# Patient Record
Sex: FEMALE | Race: WHITE | NOT HISPANIC OR LATINO | Employment: FULL TIME | URBAN - METROPOLITAN AREA
[De-identification: names, ages, dates, MRNs, and addresses within clinical notes are randomized per-mention and may not be internally consistent; named-entity substitution may affect disease eponyms.]

---

## 2019-06-04 PROBLEM — M18.0 PRIMARY OSTEOARTHRITIS OF BOTH FIRST CARPOMETACARPAL JOINTS: Status: ACTIVE | Noted: 2019-06-04

## 2019-07-02 PROBLEM — M18.0 ARTHRITIS OF CARPOMETACARPAL (CMC) JOINT OF BOTH THUMBS: Status: ACTIVE | Noted: 2019-07-02

## 2019-07-02 PROBLEM — R22.31: Status: ACTIVE | Noted: 2019-07-02

## 2019-07-18 PROBLEM — E53.8 VITAMIN B12 DEFICIENCY: Status: ACTIVE | Noted: 2019-07-18

## 2020-08-05 ENCOUNTER — TELEPHONE (OUTPATIENT)
Dept: ORTHOPEDICS | Facility: CLINIC | Age: 55
End: 2020-08-05

## 2020-08-05 NOTE — TELEPHONE ENCOUNTER
Unable to reach pt to schedule an appointment with Dr. Blanca. Left message with information to follow up.

## 2020-08-31 ENCOUNTER — OFFICE VISIT (OUTPATIENT)
Dept: ORTHOPEDICS | Facility: CLINIC | Age: 55
End: 2020-08-31
Payer: COMMERCIAL

## 2020-08-31 VITALS
HEIGHT: 67 IN | DIASTOLIC BLOOD PRESSURE: 68 MMHG | BODY MASS INDEX: 26.09 KG/M2 | SYSTOLIC BLOOD PRESSURE: 117 MMHG | HEART RATE: 71 BPM | WEIGHT: 166.25 LBS

## 2020-08-31 DIAGNOSIS — M18.0 PRIMARY ARTHROSIS OF FIRST CARPOMETACARPAL JOINTS, BILATERAL: Primary | ICD-10-CM

## 2020-08-31 PROCEDURE — 99999 PR PBB SHADOW E&M-EST. PATIENT-LVL III: ICD-10-PCS | Mod: PBBFAC,,, | Performed by: ORTHOPAEDIC SURGERY

## 2020-08-31 PROCEDURE — 99203 OFFICE O/P NEW LOW 30 MIN: CPT | Mod: S$GLB,,, | Performed by: ORTHOPAEDIC SURGERY

## 2020-08-31 PROCEDURE — 99203 PR OFFICE/OUTPT VISIT, NEW, LEVL III, 30-44 MIN: ICD-10-PCS | Mod: S$GLB,,, | Performed by: ORTHOPAEDIC SURGERY

## 2020-08-31 PROCEDURE — 99999 PR PBB SHADOW E&M-EST. PATIENT-LVL III: CPT | Mod: PBBFAC,,, | Performed by: ORTHOPAEDIC SURGERY

## 2020-08-31 RX ORDER — MELOXICAM 15 MG/1
15 TABLET ORAL DAILY
Qty: 30 TABLET | Refills: 0 | Status: SHIPPED | OUTPATIENT
Start: 2020-08-31 | End: 2021-03-12

## 2020-08-31 NOTE — LETTER
September 2, 2020      [place]  [address] [address] [facility]  [facility] [address]           [facility]  [address] [place] [address]  Phone: [phone]          Patient: Diane Vazquez   MR Number: [id number]   YOB: 1965   Date of Visit: 8/31/2020       Dear Dr. Samson Talamantes II:    Thank you for referring Diane Vazquez to me for evaluation. Attached you will find relevant portions of my assessment and plan of care.    If you have questions, please do not hesitate to call me. I look forward to following Diane Vazquez along with you.    Sincerely,    Miah Blanca MD    Enclosure  CC:  No Recipients    If you would like to receive this communication electronically, please contact [email or web address] or [phone] to request more information on [facility] access.    For providers and/or their staff who would like to refer a patient to [facility], please contact us through our one-stop-shop provider referral line, [facility], at [phone].    If you feel you have received this communication in error or would no longer like to receive these types of communications, please e-mail [email or web address]

## 2020-08-31 NOTE — PROGRESS NOTES
"8/31/2020    Chief Complaint:  Chief Complaint   Patient presents with    Bilateral thumb/hand pain onset 8 months ago     pt states "I've had 3 rounds of injections for this."       HPI:  Diane Vazquez is a 55 y.o. female, who presents to clinic today she has a history of bilateral thumb pain.  She states that this started approximately 3 months ago.  She has been seen by Dr. Alonzo.  He has performed 3 separate injections into the CMC joints.  She states that the initial injection did give her relief but the most recent injections have lasted only 1 or 2 weeks.  She is here today for further evaluation and treatment options.  She has no other complaints period    PMHX:  Past Medical History:   Diagnosis Date    Anxiety state, unspecified     Madsen's esophagus     Chronic lymphocytic thyroiditis     Early stage skin cancer     Other and unspecified hyperlipidemia     Pure hypercholesterolemia     Tobacco use disorder        PSHX:  Past Surgical History:   Procedure Laterality Date    CHOLECYSTECTOMY         FMHX:  Family History   Problem Relation Age of Onset    Heart disease Father     Hyperlipidemia Father     Hypertension Father     Cancer Father         colon, lungs    Melanoma Mother     Mental illness Brother     Other Daughter         bowel disease    Depression Daughter     Depression Maternal Grandmother     Cancer Paternal Grandmother        SOCHX:  Social History     Tobacco Use    Smoking status: Current Every Day Smoker     Packs/day: 0.50     Years: 34.00     Pack years: 17.00     Types: Cigarettes    Smokeless tobacco: Never Used   Substance Use Topics    Alcohol use: Yes     Frequency: 2-4 times a month     Drinks per session: 1 or 2     Binge frequency: Never     Comment: Occasionally: Wine       ALLERGIES:  Patient has no known allergies.    CURRENT MEDICATIONS:  Current Outpatient Medications on File Prior to Visit   Medication Sig Dispense Refill    buPROPion " (WELLBUTRIN) 75 MG tablet Take 1 tablet (75 mg total) by mouth 2 (two) times daily. 90 tablet 2    cyanocobalamin 1,000 mcg/mL injection Inject 1 mL (1,000 mcg total) into the skin every 28 days. 4 mL 2    ergocalciferol, vitamin D2, (VITAMIN D ORAL) Vitamin D      ezetimibe (ZETIA) 10 mg tablet Take 1 tablet (10 mg total) by mouth once daily. 90 tablet 3    levothyroxine (SYNTHROID) 88 MCG tablet Take 1 tablet (88 mcg total) by mouth before breakfast. 90 tablet 3    mirabegron (MYRBETRIQ) 25 mg Tb24 ER tablet Take 1 tablet (25 mg total) by mouth once daily. 30 tablet 11    omeprazole (PRILOSEC) 20 MG capsule Take 1 capsule (20 mg total) by mouth once daily. 90 capsule 2    albuterol (PROVENTIL/VENTOLIN HFA) 90 mcg/actuation inhaler Inhale 2 puffs into the lungs every 4 (four) hours as needed for Wheezing. Rescue 18 g 2    budesonide-formoterol 160-4.5 mcg (SYMBICORT) 160-4.5 mcg/actuation HFAA Inhale 2 puffs into the lungs 2 (two) times daily. Controller for 5 days (Patient not taking: Reported on 3/3/2020) 6 g 0    valACYclovir (VALTREX) 500 MG tablet Take 1 tablet (500 mg total) by mouth 2 (two) times daily. for 5 days 10 tablet 3     No current facility-administered medications on file prior to visit.        REVIEW OF SYSTEMS:  Review of Systems   Constitutional: Negative for diaphoresis and fever.   HENT: Positive for tinnitus. Negative for ear pain, hearing loss and nosebleeds.    Eyes: Negative for pain and redness.   Respiratory: Negative for cough and shortness of breath.    Cardiovascular: Negative for chest pain and palpitations.   Gastrointestinal: Negative for blood in stool, constipation, diarrhea, nausea and vomiting.   Genitourinary: Positive for frequency. Negative for dysuria and hematuria.   Musculoskeletal: Negative for back pain and myalgias.   Skin: Negative for itching and rash.   Neurological: Negative for dizziness, tingling, seizures, weakness and headaches.   Endo/Heme/Allergies:  "Negative for environmental allergies.   Psychiatric/Behavioral: The patient is not nervous/anxious.        GENERAL PHYSICAL EXAM:   Ht 5' 6.5" (1.689 m)   Wt 75.4 kg (166 lb 3.6 oz)   BMI 26.43 kg/m²    GEN: well developed, well nourished, no acute distress   HENT: Normocephalic, atraumatic   EYES: No discharge, conjunctiva normal   NECK: Supple, non-tender   PULM: No wheezing, no respiratory distress   CV: RRR   ABD: Soft, non-tender    ORTHO EXAM:   Examination bilateral hands reveals that there is no edema.  There are no significant deformities.  Palpation produces tenderness over the CMC joints bilaterally.  She does have positive grind test on the right and on the left.  Symptoms on left wrist are significantly less than the right.  She is able to flex and extend both thumbs.  She does have sensation intact in the median radial ulnar distributions bilaterally.  She has 2+ radial pulses bilaterally.    RADIOLOGY:   X-rays of both the right and left hands from approximately 1 year ago have been reviewed.  She is noted to have moderate degenerative changes about the right thumb CMC joint and mild on the left.  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  I would like to have the patient attempt oral anti-inflammatory therapy and splinting and therefore will provide a prescription for Mobic and I will write her the information to obtain a Gloria  splint    2.  She will follow up with me in 6 weeks for an assessment of the effectiveness of those treatments at which point I will discuss further treatment options which could include surgical intervention is the patient has failed steroid injections      Answers for HPI/ROS submitted by the patient on 8/25/2020   Hand injury  unexpected weight change: Yes  appetite change : No  sleep disturbance: Yes  IMMUNOCOMPROMISED: No  dysphoric mood: No  visual disturbance: No  sinus pressure " : No  food allergies: Yes  difficulty urinating: No  painful intercourse: No  numbness: No  joint swelling: No  Pain Chronicity: chronic  History of trauma: No  Onset: more than 1 year ago  Frequency: constantly  Progression since onset: gradually worsening  injury location: at home  pain- numeric: 8/10  pain location: left hand, right hand  pain quality: hot, sharp, burning  Radiating Pain: Yes  If your pain is radiating, to what part of the body?: left arm, right arm  Aggravating factors: activity  inability to bear weight: No  joint locking: No  limited range of motion: Yes  stiffness: Yes  Treatments tried: cold, heat, injection treatment, NSAIDs, rest  physical therapy: not tried  Improvement on treatment: no relief

## 2020-10-12 ENCOUNTER — OFFICE VISIT (OUTPATIENT)
Dept: ORTHOPEDICS | Facility: CLINIC | Age: 55
End: 2020-10-12
Payer: COMMERCIAL

## 2020-10-12 ENCOUNTER — OFFICE VISIT (OUTPATIENT)
Dept: GASTROENTEROLOGY | Facility: CLINIC | Age: 55
End: 2020-10-12
Payer: COMMERCIAL

## 2020-10-12 VITALS
BODY MASS INDEX: 26.09 KG/M2 | WEIGHT: 166.25 LBS | DIASTOLIC BLOOD PRESSURE: 60 MMHG | HEIGHT: 67 IN | HEART RATE: 66 BPM | SYSTOLIC BLOOD PRESSURE: 130 MMHG

## 2020-10-12 DIAGNOSIS — K22.70 BARRETT'S ESOPHAGUS WITHOUT DYSPLASIA: Primary | ICD-10-CM

## 2020-10-12 DIAGNOSIS — Z80.0 FAMILY HISTORY OF COLON CANCER: ICD-10-CM

## 2020-10-12 DIAGNOSIS — K21.9 GASTROESOPHAGEAL REFLUX DISEASE, UNSPECIFIED WHETHER ESOPHAGITIS PRESENT: ICD-10-CM

## 2020-10-12 DIAGNOSIS — Z01.812 PRE-PROCEDURE LAB EXAM: ICD-10-CM

## 2020-10-12 DIAGNOSIS — M18.0 ARTHRITIS OF CARPOMETACARPAL (CMC) JOINT OF BOTH THUMBS: Primary | ICD-10-CM

## 2020-10-12 PROCEDURE — 20600 DRAIN/INJ JOINT/BURSA W/O US: CPT | Mod: 50,S$GLB,, | Performed by: ORTHOPAEDIC SURGERY

## 2020-10-12 PROCEDURE — 99213 OFFICE O/P EST LOW 20 MIN: CPT | Mod: 25,S$GLB,, | Performed by: ORTHOPAEDIC SURGERY

## 2020-10-12 PROCEDURE — 99999 PR PBB SHADOW E&M-EST. PATIENT-LVL III: ICD-10-PCS | Mod: PBBFAC,,, | Performed by: INTERNAL MEDICINE

## 2020-10-12 PROCEDURE — 99203 OFFICE O/P NEW LOW 30 MIN: CPT | Mod: S$GLB,,, | Performed by: INTERNAL MEDICINE

## 2020-10-12 PROCEDURE — 99203 PR OFFICE/OUTPT VISIT, NEW, LEVL III, 30-44 MIN: ICD-10-PCS | Mod: S$GLB,,, | Performed by: INTERNAL MEDICINE

## 2020-10-12 PROCEDURE — 99999 PR PBB SHADOW E&M-EST. PATIENT-LVL III: CPT | Mod: PBBFAC,,, | Performed by: ORTHOPAEDIC SURGERY

## 2020-10-12 PROCEDURE — 20600 SMALL JOINT ASPIRATION/INJECTION: ICD-10-PCS | Mod: 50,S$GLB,, | Performed by: ORTHOPAEDIC SURGERY

## 2020-10-12 PROCEDURE — 99213 PR OFFICE/OUTPT VISIT, EST, LEVL III, 20-29 MIN: ICD-10-PCS | Mod: 25,S$GLB,, | Performed by: ORTHOPAEDIC SURGERY

## 2020-10-12 PROCEDURE — 99999 PR PBB SHADOW E&M-EST. PATIENT-LVL III: CPT | Mod: PBBFAC,,, | Performed by: INTERNAL MEDICINE

## 2020-10-12 PROCEDURE — 99999 PR PBB SHADOW E&M-EST. PATIENT-LVL III: ICD-10-PCS | Mod: PBBFAC,,, | Performed by: ORTHOPAEDIC SURGERY

## 2020-10-12 RX ORDER — MELATONIN 5 MG
CAPSULE ORAL
Status: ON HOLD | COMMUNITY
End: 2022-08-25 | Stop reason: HOSPADM

## 2020-10-12 RX ORDER — HYDROCODONE BITARTRATE AND ACETAMINOPHEN 7.5; 325 MG/1; MG/1
1 TABLET ORAL
COMMUNITY
Start: 2020-10-02 | End: 2021-03-12

## 2020-10-12 RX ADMIN — TRIAMCINOLONE ACETONIDE 40 MG: 40 INJECTION, SUSPENSION INTRA-ARTICULAR; INTRAMUSCULAR at 08:10

## 2020-10-12 NOTE — PROGRESS NOTES
Ms Paredes returns to clinic today.  It she has a history of bilateral thumb CMC arthritis.  We attempted splinting and Mobic.  She states that the Mobic made her very sick and she could not continue to take it.  She states that the splint did improve her symptoms on the right.  She is thinking about get 1 for the left.  She is still complaining of pain to the bases of both thumbs    Physical exam:  Examination of both the right and left hand reveals that there is no edema.  There are no skin changes.  Palpation produces tenderness over the CMC joints bilaterally.  She has bilateral positive grind test.  She has 2+ radial pulse.  Sensation is grossly intact in the median radial ulnar distribution bilaterally.    Assessment:  Bilateral thumb CMC arthritis    Plan:    1.  I do think that it is reasonable to continue to attempt steroid injection.  I will therefore place steroid injections to both the right and left thumbs today    2.  After informed consent was obtained injections were placed both the right and left thumb CMC joints.  The patient tolerated that well.    3.  She will follow up with me on a p.r.n. basis.  If she does not have significant improvement from these injections I will consider beginning the discussion for surgery

## 2020-10-12 NOTE — PROGRESS NOTES
Subjective:       Patient ID: Diane Vazquez is a 55 y.o. female.    Chief Complaint: Establish Care and dennis's esophagus    This is our first encounter with Ms. Vazquez, who was referred by Dr. Méndez, to schedule endoscopies, because of a history of Dennis's esophagus and a FHx of colon cancer (father).  She had severe reflux symptoms during her pregnancy, but then it continued for several years after and she finally had an EGD (in the 90s) and was dx'd with the dennis's.  She was having yearly scopes for several years, but eventually was moved to a q 3 yr schedule.  All this occurred in [place], and she then moved to this area about 12 yrs ago.  She is long overdue for her EGD f/u.  She manages on omeprazole 20 mg /day.   After her father was dx'd with colon cancer, she had her first colonoscopy at age 39 (~2004).  She has not had a subsequent one.    She is asymptomatic for both areas.  She denies signif heartburn.  She denies dysphagia.  No epig or abdo pain.  No melena.  No BRBPR.  BMs are normal.    Soc Hx:  Is a nurse, works at [employer].    Currently smokes, ~1/2 ppd.      Past Surgical History:  CHOLECYSTECTOMY ~2006  COLONOSCOPY ~2004   Father with hx of colon cancer.  Had her first one at 38 y/o.  ESOPHAGOGASTRODUODENOSCOPY ~2008   Dennis's esophagus.        Review of Systems   Constitutional: Negative for activity change, appetite change, fatigue, fever and unexpected weight change.   HENT: Negative.  Negative for dental problem, drooling, mouth sores, sore throat, trouble swallowing and voice change.    Eyes: Negative.    Respiratory: Negative for cough, choking, shortness of breath, wheezing and stridor.    Cardiovascular: Negative for chest pain.   Gastrointestinal: Negative for abdominal distention, abdominal pain, anal bleeding, blood in stool, constipation, diarrhea, nausea, rectal pain and vomiting.   Genitourinary: Negative.    Musculoskeletal: Negative for arthralgias, joint  swelling, myalgias and neck stiffness.   Integumentary:  Negative for color change and rash.   Neurological: Negative for weakness.   Hematological: Negative for adenopathy. Does not bruise/bleed easily.   Psychiatric/Behavioral: Negative for agitation, behavioral problems, confusion, decreased concentration and dysphoric mood.         Objective:      Physical Exam  Constitutional:       General: She is not in acute distress.     Appearance: Normal appearance. She is well-developed.   HENT:      Head: Normocephalic.      Nose: Nose normal.      Mouth/Throat:      Mouth: Mucous membranes are moist.      Pharynx: No oropharyngeal exudate.   Eyes:      General: No scleral icterus.     Conjunctiva/sclera: Conjunctivae normal.   Neck:      Musculoskeletal: Neck supple.      Thyroid: No thyromegaly.      Trachea: No tracheal deviation.   Cardiovascular:      Rate and Rhythm: Normal rate and regular rhythm.      Heart sounds: Normal heart sounds. No murmur. No gallop.    Pulmonary:      Effort: Pulmonary effort is normal.      Breath sounds: Normal breath sounds. No wheezing or rales.   Abdominal:      General: Abdomen is flat. Bowel sounds are normal. There is no distension.      Palpations: Abdomen is soft. There is no mass.      Tenderness: There is no abdominal tenderness. There is no guarding.   Musculoskeletal: Normal range of motion.   Lymphadenopathy:      Cervical: No cervical adenopathy.   Skin:     General: Skin is warm and dry.      Findings: No erythema or rash.   Neurological:      Mental Status: She is alert and oriented to person, place, and time.   Psychiatric:         Mood and Affect: Mood normal.         Behavior: Behavior normal.         Thought Content: Thought content normal.         Judgment: Judgment normal.         Assessment:         Madsen's esophagus without dysplasia    Gastroesophageal reflux disease, unspecified whether esophagitis present    Family history of colon cancer      Plan:         1. Schedule for EGD and Colonoscopy.   Mag Cit prep.   2. Cont the omep 20 q am.

## 2020-10-13 RX ORDER — TRIAMCINOLONE ACETONIDE 40 MG/ML
40 INJECTION, SUSPENSION INTRA-ARTICULAR; INTRAMUSCULAR
Status: DISCONTINUED | OUTPATIENT
Start: 2020-10-12 | End: 2020-10-13 | Stop reason: HOSPADM

## 2020-10-13 NOTE — PROCEDURES
Small Joint Aspiration/Injection    Date/Time: 10/12/2020 8:00 AM  Performed by: Miah Blanca MD  Authorized by: Miah Blanca MD     Consent Done?:  Yes (Verbal)  Indications:  Pain  Site marked: the procedure site was marked    Timeout: prior to procedure the correct patient, procedure, and site was verified    Local anesthetic:  Topical anesthetic  Location:  Thumb  Thumb joint: Left thumb CMC joint.  Ultrasonic guidance for needle placement?: No    Needle size:  25 G  Medications:  40 mg triamcinolone acetonide 40 mg/mL; 40 mg triamcinolone acetonide 40 mg/mL (20 mg injected)  Patient tolerance:  Patient tolerated the procedure well with no immediate complications

## 2020-10-13 NOTE — PROCEDURES
Small Joint Aspiration/Injection    Date/Time: 10/12/2020 8:00 AM  Performed by: Miah Blanca MD  Authorized by: Miah Blanca MD     Consent Done?:  Yes (Verbal)  Indications:  Pain  Site marked: the procedure site was marked    Timeout: prior to procedure the correct patient, procedure, and site was verified    Prep: patient was prepped and draped in usual sterile fashion      Local anesthesia used?: No    Location:  Thumb  Thumb joint: Right thumb CMC joint.  Ultrasonic guidance for needle placement?: No    Medications:  40 mg triamcinolone acetonide 40 mg/mL  Patient tolerance:  Patient tolerated the procedure well with no immediate complications

## 2020-11-24 ENCOUNTER — TELEPHONE (OUTPATIENT)
Dept: GASTROENTEROLOGY | Facility: CLINIC | Age: 55
End: 2020-11-24

## 2020-11-24 NOTE — TELEPHONE ENCOUNTER
----- Message from Jaren Terrazas sent at 11/24/2020  2:41 PM CST -----  Contact: patient  Type: Needs Medical Advice  Who Called:  patient  Symptoms (please be specific):    How long has patient had these symptoms:    Pharmacy name and phone #:    Best Call Back Number: [phone]  Additional Information: called to cancel upcoming procedure

## 2020-12-11 ENCOUNTER — TELEPHONE (OUTPATIENT)
Dept: GASTROENTEROLOGY | Facility: CLINIC | Age: 55
End: 2020-12-11

## 2020-12-11 NOTE — TELEPHONE ENCOUNTER
----- Message from Bret Breen sent at 12/11/2020  1:41 PM CST -----  Regarding: appointment  Contact: self  Type: Needs Medical Advice  Who Called:  self   Symptoms (please be specific):    How long has patient had these symptoms:    Pharmacy name and phone #:    Best Call Back Number: [phone]   Additional Information: Patient requesting to reschedule procedures .

## 2021-01-20 ENCOUNTER — TELEPHONE (OUTPATIENT)
Dept: GASTROENTEROLOGY | Facility: CLINIC | Age: 56
End: 2021-01-20

## 2021-04-01 PROBLEM — M25.612 DECREASED ROM OF LEFT SHOULDER: Status: ACTIVE | Noted: 2021-04-01

## 2021-04-01 PROBLEM — M25.512 ACUTE PAIN OF LEFT SHOULDER: Status: ACTIVE | Noted: 2021-04-01

## 2021-04-01 PROBLEM — M54.2 NECK PAIN: Status: ACTIVE | Noted: 2021-04-01

## 2021-04-01 PROBLEM — M53.82 DECREASED ROM OF INTERVERTEBRAL DISCS OF CERVICAL SPINE: Status: ACTIVE | Noted: 2021-04-01

## 2021-05-12 ENCOUNTER — OFFICE VISIT (OUTPATIENT)
Dept: ORTHOPEDICS | Facility: CLINIC | Age: 56
End: 2021-05-12
Payer: COMMERCIAL

## 2021-05-12 VITALS
DIASTOLIC BLOOD PRESSURE: 72 MMHG | BODY MASS INDEX: 25.39 KG/M2 | HEIGHT: 66 IN | HEART RATE: 69 BPM | WEIGHT: 158 LBS | SYSTOLIC BLOOD PRESSURE: 124 MMHG

## 2021-05-12 DIAGNOSIS — M18.0 PRIMARY ARTHROSIS OF FIRST CARPOMETACARPAL JOINTS, BILATERAL: Primary | ICD-10-CM

## 2021-05-12 PROCEDURE — 20600 PR DRAIN/INJECT SMALL JOINT/BURSA: ICD-10-PCS | Mod: 50,S$GLB,, | Performed by: ORTHOPAEDIC SURGERY

## 2021-05-12 PROCEDURE — 99999 PR PBB SHADOW E&M-EST. PATIENT-LVL III: ICD-10-PCS | Mod: PBBFAC,,, | Performed by: ORTHOPAEDIC SURGERY

## 2021-05-12 PROCEDURE — 99213 OFFICE O/P EST LOW 20 MIN: CPT | Mod: 25,S$GLB,, | Performed by: ORTHOPAEDIC SURGERY

## 2021-05-12 PROCEDURE — 20600 DRAIN/INJ JOINT/BURSA W/O US: CPT | Mod: 50,S$GLB,, | Performed by: ORTHOPAEDIC SURGERY

## 2021-05-12 PROCEDURE — 99213 PR OFFICE/OUTPT VISIT, EST, LEVL III, 20-29 MIN: ICD-10-PCS | Mod: 25,S$GLB,, | Performed by: ORTHOPAEDIC SURGERY

## 2021-05-12 PROCEDURE — 99999 PR PBB SHADOW E&M-EST. PATIENT-LVL III: CPT | Mod: PBBFAC,,, | Performed by: ORTHOPAEDIC SURGERY

## 2021-05-12 RX ADMIN — TRIAMCINOLONE ACETONIDE 40 MG: 40 INJECTION, SUSPENSION INTRA-ARTICULAR; INTRAMUSCULAR at 08:05

## 2021-05-20 RX ORDER — TRIAMCINOLONE ACETONIDE 40 MG/ML
40 INJECTION, SUSPENSION INTRA-ARTICULAR; INTRAMUSCULAR
Status: DISCONTINUED | OUTPATIENT
Start: 2021-05-12 | End: 2021-05-20 | Stop reason: HOSPADM

## 2021-09-15 ENCOUNTER — OFFICE VISIT (OUTPATIENT)
Dept: ORTHOPEDICS | Facility: CLINIC | Age: 56
End: 2021-09-15
Payer: COMMERCIAL

## 2021-09-15 VITALS — WEIGHT: 158 LBS | HEIGHT: 66 IN | BODY MASS INDEX: 25.39 KG/M2

## 2021-09-15 DIAGNOSIS — M18.0 PRIMARY OSTEOARTHRITIS OF BOTH FIRST CARPOMETACARPAL JOINTS: Primary | ICD-10-CM

## 2021-09-15 PROCEDURE — 20600 PR DRAIN/INJECT SMALL JOINT/BURSA: ICD-10-PCS | Mod: 50,S$GLB,, | Performed by: ORTHOPAEDIC SURGERY

## 2021-09-15 PROCEDURE — 99213 PR OFFICE/OUTPT VISIT, EST, LEVL III, 20-29 MIN: ICD-10-PCS | Mod: 25,S$GLB,, | Performed by: ORTHOPAEDIC SURGERY

## 2021-09-15 PROCEDURE — 20600 DRAIN/INJ JOINT/BURSA W/O US: CPT | Mod: 50,S$GLB,, | Performed by: ORTHOPAEDIC SURGERY

## 2021-09-15 PROCEDURE — 99999 PR PBB SHADOW E&M-EST. PATIENT-LVL III: CPT | Mod: PBBFAC,,, | Performed by: ORTHOPAEDIC SURGERY

## 2021-09-15 PROCEDURE — 99999 PR PBB SHADOW E&M-EST. PATIENT-LVL III: ICD-10-PCS | Mod: PBBFAC,,, | Performed by: ORTHOPAEDIC SURGERY

## 2021-09-15 PROCEDURE — 99213 OFFICE O/P EST LOW 20 MIN: CPT | Mod: 25,S$GLB,, | Performed by: ORTHOPAEDIC SURGERY

## 2021-09-15 RX ADMIN — TRIAMCINOLONE ACETONIDE 40 MG: 40 INJECTION, SUSPENSION INTRA-ARTICULAR; INTRAMUSCULAR at 03:09

## 2021-09-19 RX ORDER — TRIAMCINOLONE ACETONIDE 40 MG/ML
40 INJECTION, SUSPENSION INTRA-ARTICULAR; INTRAMUSCULAR
Status: DISCONTINUED | OUTPATIENT
Start: 2021-09-15 | End: 2021-09-20 | Stop reason: HOSPADM

## 2022-01-03 ENCOUNTER — OFFICE VISIT (OUTPATIENT)
Dept: ORTHOPEDICS | Facility: CLINIC | Age: 57
End: 2022-01-03
Payer: COMMERCIAL

## 2022-01-03 ENCOUNTER — HOSPITAL ENCOUNTER (OUTPATIENT)
Dept: RADIOLOGY | Facility: HOSPITAL | Age: 57
Discharge: HOME | End: 2022-01-03
Attending: ORTHOPAEDIC SURGERY
Payer: COMMERCIAL

## 2022-01-03 VITALS — WEIGHT: 158 LBS | BODY MASS INDEX: 25.39 KG/M2 | HEIGHT: 66 IN

## 2022-01-03 DIAGNOSIS — M18.11 ARTHRITIS OF CARPOMETACARPAL (CMC) JOINT OF RIGHT THUMB: Primary | ICD-10-CM

## 2022-01-03 DIAGNOSIS — M18.11 ARTHRITIS OF CARPOMETACARPAL (CMC) JOINT OF RIGHT THUMB: ICD-10-CM

## 2022-01-03 PROCEDURE — 1159F PR MEDICATION LIST DOCUMENTED IN MEDICAL RECORD: ICD-10-PCS | Mod: CPTII,S$GLB,, | Performed by: ORTHOPAEDIC SURGERY

## 2022-01-03 PROCEDURE — 73130 X-RAY EXAM OF HAND: CPT | Mod: 26,RT,, | Performed by: RADIOLOGY

## 2022-01-03 PROCEDURE — 73130 X-RAY EXAM OF HAND: CPT | Mod: TC,PO,RT

## 2022-01-03 PROCEDURE — 99999 PR PBB SHADOW E&M-EST. PATIENT-LVL III: CPT | Mod: PBBFAC,,, | Performed by: ORTHOPAEDIC SURGERY

## 2022-01-03 PROCEDURE — 99213 OFFICE O/P EST LOW 20 MIN: CPT | Mod: 25,S$GLB,, | Performed by: ORTHOPAEDIC SURGERY

## 2022-01-03 PROCEDURE — 20600 DRAIN/INJ JOINT/BURSA W/O US: CPT | Mod: F5,S$GLB,, | Performed by: ORTHOPAEDIC SURGERY

## 2022-01-03 PROCEDURE — 3008F PR BODY MASS INDEX (BMI) DOCUMENTED: ICD-10-PCS | Mod: CPTII,S$GLB,, | Performed by: ORTHOPAEDIC SURGERY

## 2022-01-03 PROCEDURE — 99213 PR OFFICE/OUTPT VISIT, EST, LEVL III, 20-29 MIN: ICD-10-PCS | Mod: 25,S$GLB,, | Performed by: ORTHOPAEDIC SURGERY

## 2022-01-03 PROCEDURE — 20600 PR DRAIN/INJECT SMALL JOINT/BURSA: ICD-10-PCS | Mod: F5,S$GLB,, | Performed by: ORTHOPAEDIC SURGERY

## 2022-01-03 PROCEDURE — 3008F BODY MASS INDEX DOCD: CPT | Mod: CPTII,S$GLB,, | Performed by: ORTHOPAEDIC SURGERY

## 2022-01-03 PROCEDURE — 73130 XR HAND COMPLETE 3 VIEW RIGHT: ICD-10-PCS | Mod: 26,RT,, | Performed by: RADIOLOGY

## 2022-01-03 PROCEDURE — 1159F MED LIST DOCD IN RCRD: CPT | Mod: CPTII,S$GLB,, | Performed by: ORTHOPAEDIC SURGERY

## 2022-01-03 PROCEDURE — 99999 PR PBB SHADOW E&M-EST. PATIENT-LVL III: ICD-10-PCS | Mod: PBBFAC,,, | Performed by: ORTHOPAEDIC SURGERY

## 2022-01-03 RX ADMIN — TRIAMCINOLONE ACETONIDE 40 MG: 40 INJECTION, SUSPENSION INTRA-ARTICULAR; INTRAMUSCULAR at 04:01

## 2022-01-03 NOTE — PROGRESS NOTES
Ms Vazquez returns to clinic today.  Has a history of bilateral thumb CMC arthritis.  The right is significantly worse than the left.  She has had injections in the past.  She is here today to discuss further treatment options    Physical exam:  Examination the right hand and wrist reveals that there is prominence of the thumb CMC joint.  Palpation over the joint does produce significant tenderness.  She has a markedly positive grind test.  She has a 2+ radial pulse and sensation is intact    Radiology:  X-rays of the right hand were taken in clinic today.  She is noted to have moderate to severe CMC arthritis of the right thumb.    Assessment:  Right thumb CMC arthritis    Plan:    1. After informed consent was obtained and injection was placed to the right thumb CMC joint.  The patient tolerated that well.    2. She will follow up with me in 3-4 months at which point I will discuss the possibility of surgical intervention

## 2022-01-04 RX ORDER — TRIAMCINOLONE ACETONIDE 40 MG/ML
40 INJECTION, SUSPENSION INTRA-ARTICULAR; INTRAMUSCULAR
Status: DISCONTINUED | OUTPATIENT
Start: 2022-01-03 | End: 2022-01-04 | Stop reason: HOSPADM

## 2022-01-04 NOTE — PROCEDURES
Small Joint Aspiration/Injection    Date/Time: 1/3/2022 4:20 PM  Performed by: Miah Blanca MD  Authorized by: Miah Blanca MD     Consent Done?:  Yes (Verbal)  Indications:  Pain  Site marked: the procedure site was marked    Timeout: prior to procedure the correct patient, procedure, and site was verified    Prep: patient was prepped and draped in usual sterile fashion      Local anesthesia used?: No    Location:  Thumb  Thumb joint: Right thumb CMC joint.  Ultrasonic guidance for needle placement?: No    Medications:  40 mg triamcinolone acetonide 40 mg/mL  Patient tolerance:  Patient tolerated the procedure well with no immediate complications

## 2022-03-28 ENCOUNTER — OFFICE VISIT (OUTPATIENT)
Dept: ORTHOPEDICS | Facility: CLINIC | Age: 57
End: 2022-03-28
Payer: COMMERCIAL

## 2022-03-28 VITALS — HEIGHT: 67 IN | BODY MASS INDEX: 23.39 KG/M2 | WEIGHT: 149 LBS

## 2022-03-28 DIAGNOSIS — M18.0 PRIMARY ARTHROSIS OF FIRST CARPOMETACARPAL JOINTS, BILATERAL: Primary | ICD-10-CM

## 2022-03-28 PROCEDURE — 99213 OFFICE O/P EST LOW 20 MIN: CPT | Mod: 25,S$GLB,, | Performed by: ORTHOPAEDIC SURGERY

## 2022-03-28 PROCEDURE — 20600 PR DRAIN/INJECT SMALL JOINT/BURSA: ICD-10-PCS | Mod: 50,S$GLB,, | Performed by: ORTHOPAEDIC SURGERY

## 2022-03-28 PROCEDURE — 1160F PR REVIEW ALL MEDS BY PRESCRIBER/CLIN PHARMACIST DOCUMENTED: ICD-10-PCS | Mod: CPTII,S$GLB,, | Performed by: ORTHOPAEDIC SURGERY

## 2022-03-28 PROCEDURE — 20600 DRAIN/INJ JOINT/BURSA W/O US: CPT | Mod: 50,S$GLB,, | Performed by: ORTHOPAEDIC SURGERY

## 2022-03-28 PROCEDURE — 1159F PR MEDICATION LIST DOCUMENTED IN MEDICAL RECORD: ICD-10-PCS | Mod: CPTII,S$GLB,, | Performed by: ORTHOPAEDIC SURGERY

## 2022-03-28 PROCEDURE — 3008F BODY MASS INDEX DOCD: CPT | Mod: CPTII,S$GLB,, | Performed by: ORTHOPAEDIC SURGERY

## 2022-03-28 PROCEDURE — 99213 PR OFFICE/OUTPT VISIT, EST, LEVL III, 20-29 MIN: ICD-10-PCS | Mod: 25,S$GLB,, | Performed by: ORTHOPAEDIC SURGERY

## 2022-03-28 PROCEDURE — 1160F RVW MEDS BY RX/DR IN RCRD: CPT | Mod: CPTII,S$GLB,, | Performed by: ORTHOPAEDIC SURGERY

## 2022-03-28 PROCEDURE — 99999 PR PBB SHADOW E&M-EST. PATIENT-LVL III: ICD-10-PCS | Mod: PBBFAC,,, | Performed by: ORTHOPAEDIC SURGERY

## 2022-03-28 PROCEDURE — 3008F PR BODY MASS INDEX (BMI) DOCUMENTED: ICD-10-PCS | Mod: CPTII,S$GLB,, | Performed by: ORTHOPAEDIC SURGERY

## 2022-03-28 PROCEDURE — 99999 PR PBB SHADOW E&M-EST. PATIENT-LVL III: CPT | Mod: PBBFAC,,, | Performed by: ORTHOPAEDIC SURGERY

## 2022-03-28 PROCEDURE — 1159F MED LIST DOCD IN RCRD: CPT | Mod: CPTII,S$GLB,, | Performed by: ORTHOPAEDIC SURGERY

## 2022-03-28 RX ADMIN — TRIAMCINOLONE ACETONIDE 40 MG: 40 INJECTION, SUSPENSION INTRA-ARTICULAR; INTRAMUSCULAR at 04:03

## 2022-03-29 RX ORDER — TRIAMCINOLONE ACETONIDE 40 MG/ML
40 INJECTION, SUSPENSION INTRA-ARTICULAR; INTRAMUSCULAR
Status: DISCONTINUED | OUTPATIENT
Start: 2022-03-28 | End: 2022-03-29 | Stop reason: HOSPADM

## 2022-03-29 NOTE — PROGRESS NOTES
Ms Paredes returns to clinic today.  Has a history of bilateral thumb CMC arthritis.  We did inject her right thumb approximately 3 months ago.  She did get good relief for short period of time with that injection.  She does continue to complain of severe symptoms of her CMC joints both the right and the left.      Physical exam:  Examination bilateral hands and wrist reveals that there is no edema.  Over the region of the right thumb CMC joint there is some fat atrophy and loss of skin turgor.  On the left there are no major skin changes noted.  Palpation still produces tenderness directly over the CMC joints bilaterally and she does have bilateral positive grind test.  She does have sensation intact in the median radial ulnar distribution capillary refill is less than 2 seconds in all the fingers.    Assessment:  Bilateral thumb CMC joint    Plan:    1. I have discussed treatment going forward.  I have discussed my concerns about repeating steroid injection specifically to the right hand due to the skin overlying.  Patient expresses an understanding.    2. After informed consent was obtained injections were placed both the right and left thumb CMC joint.  The patient tolerated these well    3. I have discussed that this is the last injection for the right hand and if she has any further pain I would like to proceed with CMC suspension arthroplasty.  The patient states that she may consider doing this in September of this year and therefore she will follow up in mid August.

## 2022-03-29 NOTE — PROCEDURES
Small Joint Aspiration/Injection    Date/Time: 3/28/2022 4:00 PM  Performed by: Miah Blanca MD  Authorized by: Miah Blanca MD     Consent Done?:  Yes (Verbal)  Indications:  Pain  Site marked: the procedure site was marked    Timeout: prior to procedure the correct patient, procedure, and site was verified    Prep: patient was prepped and draped in usual sterile fashion      Local anesthesia used?: No    Location:  Thumb  Thumb joint: Right thumb CMC joint.  Ultrasonic guidance for needle placement?: No    Medications:  40 mg triamcinolone acetonide 40 mg/mL  Patient tolerance:  Patient tolerated the procedure well with no immediate complications

## 2022-03-29 NOTE — PROCEDURES
Small Joint Aspiration/Injection    Date/Time: 3/28/2022 4:00 PM  Performed by: Miah Blanca MD  Authorized by: Miah Blanca MD     Consent Done?:  Yes (Verbal)  Indications:  Pain  Site marked: the procedure site was marked    Timeout: prior to procedure the correct patient, procedure, and site was verified    Local anesthetic:  Topical anesthetic  Location:  Thumb  Thumb joint: Left thumb CMC joint.  Ultrasonic guidance for needle placement?: No    Needle size:  25 G  Medications:  40 mg triamcinolone acetonide 40 mg/mL; 40 mg triamcinolone acetonide 40 mg/mL (20 mg injected)  Patient tolerance:  Patient tolerated the procedure well with no immediate complications

## 2022-07-25 ENCOUNTER — OFFICE VISIT (OUTPATIENT)
Dept: ORTHOPEDICS | Facility: CLINIC | Age: 57
End: 2022-07-25
Payer: COMMERCIAL

## 2022-07-25 DIAGNOSIS — M18.0 PRIMARY ARTHROSIS OF FIRST CARPOMETACARPAL JOINTS, BILATERAL: Primary | ICD-10-CM

## 2022-07-25 PROCEDURE — 3044F HG A1C LEVEL LT 7.0%: CPT | Mod: CPTII,S$GLB,, | Performed by: ORTHOPAEDIC SURGERY

## 2022-07-25 PROCEDURE — 99214 PR OFFICE/OUTPT VISIT, EST, LEVL IV, 30-39 MIN: ICD-10-PCS | Mod: 25,S$GLB,, | Performed by: ORTHOPAEDIC SURGERY

## 2022-07-25 PROCEDURE — 20600 DRAIN/INJ JOINT/BURSA W/O US: CPT | Mod: LT,S$GLB,, | Performed by: ORTHOPAEDIC SURGERY

## 2022-07-25 PROCEDURE — 3044F PR MOST RECENT HEMOGLOBIN A1C LEVEL <7.0%: ICD-10-PCS | Mod: CPTII,S$GLB,, | Performed by: ORTHOPAEDIC SURGERY

## 2022-07-25 PROCEDURE — 99999 PR PBB SHADOW E&M-EST. PATIENT-LVL III: CPT | Mod: PBBFAC,,, | Performed by: ORTHOPAEDIC SURGERY

## 2022-07-25 PROCEDURE — 99999 PR PBB SHADOW E&M-EST. PATIENT-LVL III: ICD-10-PCS | Mod: PBBFAC,,, | Performed by: ORTHOPAEDIC SURGERY

## 2022-07-25 PROCEDURE — 1159F PR MEDICATION LIST DOCUMENTED IN MEDICAL RECORD: ICD-10-PCS | Mod: CPTII,S$GLB,, | Performed by: ORTHOPAEDIC SURGERY

## 2022-07-25 PROCEDURE — 20600 PR DRAIN/INJECT SMALL JOINT/BURSA: ICD-10-PCS | Mod: LT,S$GLB,, | Performed by: ORTHOPAEDIC SURGERY

## 2022-07-25 PROCEDURE — 1159F MED LIST DOCD IN RCRD: CPT | Mod: CPTII,S$GLB,, | Performed by: ORTHOPAEDIC SURGERY

## 2022-07-25 PROCEDURE — 99214 OFFICE O/P EST MOD 30 MIN: CPT | Mod: 25,S$GLB,, | Performed by: ORTHOPAEDIC SURGERY

## 2022-07-25 RX ADMIN — TRIAMCINOLONE ACETONIDE 40 MG: 40 INJECTION, SUSPENSION INTRA-ARTICULAR; INTRAMUSCULAR at 01:07

## 2022-07-25 NOTE — PROGRESS NOTES
7/25/2022    Chief Complaint:  Chief Complaint   Patient presents with    Left Hand - Pain    Right Hand - Pain       HPI:  Diane Vazquez is a 57 y.o. female, who presents to clinic today history of bilateral thumb CMC arthritis.  She has been injected in the past.  She has tried wearing splints.  She is here today because she is still having severe pain to both of her hands.  She is considering undergoing surgical treatment.    PMHX:  Past Medical History:   Diagnosis Date    Anxiety state, unspecified     Madsen's esophagus 1990s    Last EGD ~2008, before she moved here from [place].     Chronic lymphocytic thyroiditis     Early stage skin cancer     Other and unspecified hyperlipidemia     Pure hypercholesterolemia     Tobacco use disorder        PSHX:  Past Surgical History:   Procedure Laterality Date    CHOLECYSTECTOMY  ~2006    COLONOSCOPY  ~2004    Father with hx of colon cancer.  Had her first one at 39 y/o.    ESOPHAGOGASTRODUODENOSCOPY  ~2008    Madsen's esophagus.       FMHX:  Family History   Problem Relation Age of Onset    Heart disease Father     Hyperlipidemia Father     Hypertension Father     Cancer Father         colon, lungs    Melanoma Mother     Mental illness Brother     Other Daughter         bowel disease    Depression Daughter     Depression Maternal Grandmother     Cancer Paternal Grandmother        SOCHX:  Social History     Tobacco Use    Smoking status: Current Every Day Smoker     Packs/day: 0.50     Years: 34.00     Pack years: 17.00     Types: Cigarettes    Smokeless tobacco: Never Used   Substance Use Topics    Alcohol use: Yes     Comment: Occasionally: Wine       ALLERGIES:  Mushroom and Meloxicam    CURRENT MEDICATIONS:  Current Outpatient Medications on File Prior to Visit   Medication Sig Dispense Refill    albuterol (PROVENTIL/VENTOLIN HFA) 90 mcg/actuation inhaler albuterol sulfate HFA 90 mcg/actuation aerosol inhaler   INHALE 2 puffs into the  lungs EVERY 4 HOURS AS NEEDED for wheezing (rescue)      buPROPion (WELLBUTRIN) 75 MG tablet TAKE 1 TABLET BY MOUTH TWICE DAILY 180 tablet 3    celecoxib (CELEBREX) 100 MG capsule Take 1 capsule (100 mg total) by mouth 2 (two) times daily. 7 capsule 2    cyanocobalamin 1,000 mcg/mL injection Inject 1 mL (1,000 mcg total) into the skin every 28 days. 4 mL 2    ezetimibe (ZETIA) 10 mg tablet Take 1 tablet (10 mg total) by mouth once daily. 90 tablet 3    fluticasone propionate (FLONASE) 50 mcg/actuation nasal spray 1 spray (50 mcg total) by Each Nostril route 2 (two) times daily. 16 mL 1    levothyroxine (SYNTHROID) 100 MCG tablet Take 1 tablet (100 mcg total) by mouth before breakfast. 30 tablet 2    omeprazole (PRILOSEC) 20 MG capsule TAKE 1 CAPSULE BY MOUTH once daily 90 capsule 3    sumatriptan (IMITREX STATDOSE) 6 mg/0.5 mL kit Inject 0.5 mLs (6 mg total) into the skin every 12 (twelve) hours as needed for Migraine. 1 kit 2    traZODone (DESYREL) 50 MG tablet Take 1 tablet (50 mg total) by mouth nightly. 90 tablet 0    ergocalciferol, vitamin D2, (VITAMIN D ORAL) Vitamin D      melatonin 5 mg Cap Take by mouth.       No current facility-administered medications on file prior to visit.       REVIEW OF SYSTEMS:  ROS    GENERAL PHYSICAL EXAM:   There were no vitals taken for this visit.   GEN: well developed, well nourished, no acute distress   HENT: Normocephalic, atraumatic   EYES: No discharge, conjunctiva normal   NECK: Supple, non-tender   PULM: No wheezing, no respiratory distress   CV: RRR   ABD: Soft, non-tender    ORTHO EXAM:   Examination of bilateral hands and wrist reveals that there is no edema.  She does have prominence of the CMC joint.  Palpation does produce severe tenderness over the CMC joints bilaterally.  Has markedly positive grind test.  She does report intact sensation in the median radial ulnar distributions and capillary refill is less than 2 seconds.    RADIOLOGY:   X-rays of  the right and the left hands have been reviewed.  She is noted to have arthritis of the CMC joints bilaterally which is moderate to severe    ASSESSMENT:   Bilateral thumb CMC arthritis    PLAN:  1. After informed consent was obtained a in injection was placed to the left thumb CMC joint.  The patient tolerated that well.    2. Had a long discussion about the risks and benefits of the surgical procedure with the patient.  After discussion of the risks and benefits informed consent has been obtained    3. Will proceed with right thumb CMC arthroplasty under general anesthesia with a single-shot supraclavicular block    4. The patient will follow up with me 2 weeks postoperatively

## 2022-07-25 NOTE — PATIENT INSTRUCTIONS
Surgery Instructions:     Your surgery is scheduled on 08/25/22  at the surgery center: [address] [facility] [address], 1st floor, second entrance.    The pre-op department will be in contact with you prior to your procedure to review medications and instructions.       Nothing to eat or drink after midnight prior to day of surgery.    You should STOP taking any blood thinners 5 days prior to surgery.     Please obtain medical and cardiac clearance as advised prior to surgery. All preoperative testing should be done as soon as possible as it may be needed to obtain clearance.    Your pre op COVID-19 test collection is not needed due to being fully vaccinated.     The surgery center will contact you the day prior to surgery to advise you of your arrival time for surgery.     Your post op appointment is scheduled on 09/07/22 @ 4:20pm.    You will be contacted by an automated text message every morning for for 14 days after your surgery inquiring if you have any COVID symptoms.  If you have any concerns regarding COVID please reply to the text message and then an [facility] On Call Registered Nurse will contact you later that day.

## 2022-07-26 ENCOUNTER — PATIENT MESSAGE (OUTPATIENT)
Dept: ORTHOPEDICS | Facility: CLINIC | Age: 57
End: 2022-07-26
Payer: COMMERCIAL

## 2022-07-26 RX ORDER — TRIAMCINOLONE ACETONIDE 40 MG/ML
40 INJECTION, SUSPENSION INTRA-ARTICULAR; INTRAMUSCULAR
Status: DISCONTINUED | OUTPATIENT
Start: 2022-07-25 | End: 2022-07-26 | Stop reason: HOSPADM

## 2022-07-27 ENCOUNTER — PATIENT MESSAGE (OUTPATIENT)
Dept: ORTHOPEDICS | Facility: CLINIC | Age: 57
End: 2022-07-27
Payer: COMMERCIAL

## 2022-07-27 NOTE — PROCEDURES
Small Joint Aspiration/Injection    Date/Time: 7/25/2022 1:00 PM  Performed by: Miah Blanca MD  Authorized by: Miah Blanca MD     Consent Done?:  Yes (Verbal)  Indications:  Pain  Site marked: the procedure site was marked    Timeout: prior to procedure the correct patient, procedure, and site was verified    Local anesthetic:  Topical anesthetic  Location:  Thumb  Thumb joint: Left thumb CMC joint.  Ultrasonic guidance for needle placement?: No    Needle size:  25 G  Medications:  40 mg triamcinolone acetonide 40 mg/mL; 40 mg triamcinolone acetonide 40 mg/mL (20 mg injected)  Patient tolerance:  Patient tolerated the procedure well with no immediate complications

## 2022-08-04 ENCOUNTER — PATIENT MESSAGE (OUTPATIENT)
Dept: ORTHOPEDICS | Facility: CLINIC | Age: 57
End: 2022-08-04
Payer: COMMERCIAL

## 2022-08-08 DIAGNOSIS — M18.11 ARTHRITIS OF CARPOMETACARPAL (CMC) JOINT OF RIGHT THUMB: Primary | ICD-10-CM

## 2022-08-08 RX ORDER — MUPIROCIN 20 MG/G
OINTMENT TOPICAL
Status: CANCELLED | OUTPATIENT
Start: 2022-08-08

## 2022-08-09 ENCOUNTER — PATIENT MESSAGE (OUTPATIENT)
Dept: ORTHOPEDICS | Facility: CLINIC | Age: 57
End: 2022-08-09
Payer: COMMERCIAL

## 2022-08-10 ENCOUNTER — PATIENT MESSAGE (OUTPATIENT)
Dept: SURGERY | Facility: HOSPITAL | Age: 57
End: 2022-08-10
Payer: COMMERCIAL

## 2022-08-11 ENCOUNTER — PATIENT MESSAGE (OUTPATIENT)
Dept: ORTHOPEDICS | Facility: CLINIC | Age: 57
End: 2022-08-11
Payer: COMMERCIAL

## 2022-08-15 DIAGNOSIS — M18.11 ARTHRITIS OF CARPOMETACARPAL (CMC) JOINT OF RIGHT THUMB: Primary | ICD-10-CM

## 2022-08-15 RX ORDER — MUPIROCIN 20 MG/G
OINTMENT TOPICAL
Status: CANCELLED | OUTPATIENT
Start: 2022-08-15

## 2022-08-25 PROBLEM — M18.11 ARTHRITIS OF CARPOMETACARPAL (CMC) JOINT OF RIGHT THUMB: Status: ACTIVE | Noted: 2022-08-25

## 2022-08-26 ENCOUNTER — PATIENT MESSAGE (OUTPATIENT)
Dept: ORTHOPEDICS | Facility: CLINIC | Age: 57
End: 2022-08-26
Payer: COMMERCIAL

## 2022-08-30 DIAGNOSIS — M18.11 ARTHRITIS OF CARPOMETACARPAL (CMC) JOINT OF RIGHT THUMB: Primary | ICD-10-CM

## 2022-09-07 ENCOUNTER — HOSPITAL ENCOUNTER (OUTPATIENT)
Dept: RADIOLOGY | Facility: HOSPITAL | Age: 57
Discharge: HOME | End: 2022-09-07
Attending: ORTHOPAEDIC SURGERY
Payer: COMMERCIAL

## 2022-09-07 ENCOUNTER — OFFICE VISIT (OUTPATIENT)
Dept: ORTHOPEDICS | Facility: CLINIC | Age: 57
End: 2022-09-07
Payer: COMMERCIAL

## 2022-09-07 DIAGNOSIS — M18.11 ARTHRITIS OF CARPOMETACARPAL (CMC) JOINT OF RIGHT THUMB: ICD-10-CM

## 2022-09-07 DIAGNOSIS — M18.11 ARTHRITIS OF CARPOMETACARPAL (CMC) JOINT OF RIGHT THUMB: Primary | ICD-10-CM

## 2022-09-07 PROCEDURE — 73130 X-RAY EXAM OF HAND: CPT | Mod: TC,PO,RT

## 2022-09-07 PROCEDURE — 73130 XR HAND COMPLETE 3 VIEW RIGHT: ICD-10-PCS | Mod: 26,RT,, | Performed by: RADIOLOGY

## 2022-09-07 PROCEDURE — 3044F PR MOST RECENT HEMOGLOBIN A1C LEVEL <7.0%: ICD-10-PCS | Mod: CPTII,S$GLB,, | Performed by: ORTHOPAEDIC SURGERY

## 2022-09-07 PROCEDURE — 1159F MED LIST DOCD IN RCRD: CPT | Mod: CPTII,S$GLB,, | Performed by: ORTHOPAEDIC SURGERY

## 2022-09-07 PROCEDURE — 29075 APPL CST ELBW FNGR SHORT ARM: CPT | Mod: 58,RT,S$GLB, | Performed by: ORTHOPAEDIC SURGERY

## 2022-09-07 PROCEDURE — 99024 PR POST-OP FOLLOW-UP VISIT: ICD-10-PCS | Mod: S$GLB,,, | Performed by: ORTHOPAEDIC SURGERY

## 2022-09-07 PROCEDURE — 29075 PR APPLY FOREARM CAST: ICD-10-PCS | Mod: 58,RT,S$GLB, | Performed by: ORTHOPAEDIC SURGERY

## 2022-09-07 PROCEDURE — 99999 PR PBB SHADOW E&M-EST. PATIENT-LVL III: CPT | Mod: PBBFAC,,, | Performed by: ORTHOPAEDIC SURGERY

## 2022-09-07 PROCEDURE — 99024 POSTOP FOLLOW-UP VISIT: CPT | Mod: S$GLB,,, | Performed by: ORTHOPAEDIC SURGERY

## 2022-09-07 PROCEDURE — 1159F PR MEDICATION LIST DOCUMENTED IN MEDICAL RECORD: ICD-10-PCS | Mod: CPTII,S$GLB,, | Performed by: ORTHOPAEDIC SURGERY

## 2022-09-07 PROCEDURE — 99999 PR PBB SHADOW E&M-EST. PATIENT-LVL III: ICD-10-PCS | Mod: PBBFAC,,, | Performed by: ORTHOPAEDIC SURGERY

## 2022-09-07 PROCEDURE — 3044F HG A1C LEVEL LT 7.0%: CPT | Mod: CPTII,S$GLB,, | Performed by: ORTHOPAEDIC SURGERY

## 2022-09-07 PROCEDURE — 73130 X-RAY EXAM OF HAND: CPT | Mod: 26,RT,, | Performed by: RADIOLOGY

## 2022-09-12 NOTE — PROGRESS NOTES
Ms Vazquez returns to clinic today.  She is 2 weeks status post right thumb CMC suspension arthroplasty.  She is overall doing well.  Pain is relatively well controlled.  She has no new complaints.      Physical exam:  Examination the right wrist and forearm reveals that the incisions are all healing very well.  There are sutures in place.  There is minimal edema.  There is mild ecchymosis noted.  She does have sensation intact over the tips of all fingers and capillary refill is less than 2 seconds     Radiology:  X-rays of the right hand were taken in clinic today.  There is noted to be evidence of the suspension arthroplasty.  The suspension appears to be well maintained.      Assessment:  Status post right thumb CMC suspension arthroplasty     Plan:    1.  Sutures were removed today and Steri-Strips are placed    2.  She was placed into a short-arm thumb spica fiberglass cast    3.  She will follow up in 3-4 weeks for repeat evaluation with an x-ray out of the cast at which time I will consider going to a Velcro brace and beginning therapy

## 2022-09-23 DIAGNOSIS — M18.11 ARTHRITIS OF CARPOMETACARPAL (CMC) JOINT OF RIGHT THUMB: Primary | ICD-10-CM

## 2022-09-28 ENCOUNTER — OFFICE VISIT (OUTPATIENT)
Dept: ORTHOPEDICS | Facility: CLINIC | Age: 57
End: 2022-09-28
Payer: COMMERCIAL

## 2022-09-28 ENCOUNTER — HOSPITAL ENCOUNTER (OUTPATIENT)
Dept: RADIOLOGY | Facility: HOSPITAL | Age: 57
Discharge: HOME | End: 2022-09-28
Attending: ORTHOPAEDIC SURGERY
Payer: COMMERCIAL

## 2022-09-28 DIAGNOSIS — M18.11 ARTHRITIS OF CARPOMETACARPAL (CMC) JOINT OF RIGHT THUMB: Primary | ICD-10-CM

## 2022-09-28 DIAGNOSIS — M18.11 ARTHRITIS OF CARPOMETACARPAL (CMC) JOINT OF RIGHT THUMB: ICD-10-CM

## 2022-09-28 PROCEDURE — 1159F PR MEDICATION LIST DOCUMENTED IN MEDICAL RECORD: ICD-10-PCS | Mod: CPTII,S$GLB,, | Performed by: ORTHOPAEDIC SURGERY

## 2022-09-28 PROCEDURE — 1159F MED LIST DOCD IN RCRD: CPT | Mod: CPTII,S$GLB,, | Performed by: ORTHOPAEDIC SURGERY

## 2022-09-28 PROCEDURE — 3044F PR MOST RECENT HEMOGLOBIN A1C LEVEL <7.0%: ICD-10-PCS | Mod: CPTII,S$GLB,, | Performed by: ORTHOPAEDIC SURGERY

## 2022-09-28 PROCEDURE — 73130 XR HAND COMPLETE 3 VIEW RIGHT: ICD-10-PCS | Mod: 26,RT,, | Performed by: RADIOLOGY

## 2022-09-28 PROCEDURE — 99024 POSTOP FOLLOW-UP VISIT: CPT | Mod: S$GLB,,, | Performed by: ORTHOPAEDIC SURGERY

## 2022-09-28 PROCEDURE — 73130 X-RAY EXAM OF HAND: CPT | Mod: 26,RT,, | Performed by: RADIOLOGY

## 2022-09-28 PROCEDURE — 99999 PR PBB SHADOW E&M-EST. PATIENT-LVL III: ICD-10-PCS | Mod: PBBFAC,,, | Performed by: ORTHOPAEDIC SURGERY

## 2022-09-28 PROCEDURE — 99024 PR POST-OP FOLLOW-UP VISIT: ICD-10-PCS | Mod: S$GLB,,, | Performed by: ORTHOPAEDIC SURGERY

## 2022-09-28 PROCEDURE — 3044F HG A1C LEVEL LT 7.0%: CPT | Mod: CPTII,S$GLB,, | Performed by: ORTHOPAEDIC SURGERY

## 2022-09-28 PROCEDURE — 73130 X-RAY EXAM OF HAND: CPT | Mod: TC,PO,RT

## 2022-09-28 PROCEDURE — 99999 PR PBB SHADOW E&M-EST. PATIENT-LVL III: CPT | Mod: PBBFAC,,, | Performed by: ORTHOPAEDIC SURGERY

## 2022-09-28 NOTE — PROGRESS NOTES
Ms Vazquez returns to clinic today.  She is approximately 5-6 weeks status post right thumb CMC arthroplasty.  She is overall doing well.  Has no major complaints.      Physical exam:  Examination the right forearm wrist and hand reveals that the incisions are healing well.  There is no significant edema.  There is no erythema.  Palpation produces very minimal tenderness.  She does report grossly intact sensation in the median radial ulnar distribution.  Capillary refill is less than 2 seconds in all the digits.      Radiology:  X-rays of the right hand were taken in clinic today.  The films have been reviewed by me.  She is noted to have evidence of the CMC suspension arthroplasty.  The suspension appears to be well maintained.     Assessment: Status post right thumb CMC arthroplasty     Plan:    1. Will place the patient into a Velcro thumb spica splint    2.  I will set her up with occupational therapy to begin the post CMC arthroplasty protocol    3.  Follow-up with me in 6 weeks for repeat evaluation

## 2022-09-30 ENCOUNTER — PATIENT MESSAGE (OUTPATIENT)
Dept: ORTHOPEDICS | Facility: CLINIC | Age: 57
End: 2022-09-30
Payer: COMMERCIAL

## 2022-10-04 ENCOUNTER — PATIENT MESSAGE (OUTPATIENT)
Dept: ORTHOPEDICS | Facility: CLINIC | Age: 57
End: 2022-10-04
Payer: COMMERCIAL

## 2022-10-04 DIAGNOSIS — M18.11 ARTHRITIS OF CARPOMETACARPAL (CMC) JOINT OF RIGHT THUMB: ICD-10-CM

## 2022-10-04 DIAGNOSIS — Z98.890 STATUS POST SURGERY: Primary | ICD-10-CM

## 2022-10-06 PROBLEM — M25.60 RANGE OF MOTION DEFICIT: Status: ACTIVE | Noted: 2021-04-01

## 2022-10-26 ENCOUNTER — OFFICE VISIT (OUTPATIENT)
Dept: ORTHOPEDICS | Facility: CLINIC | Age: 57
End: 2022-10-26
Payer: COMMERCIAL

## 2022-10-26 DIAGNOSIS — M18.11 ARTHRITIS OF CARPOMETACARPAL (CMC) JOINT OF RIGHT THUMB: Primary | ICD-10-CM

## 2022-10-26 PROCEDURE — 99999 PR PBB SHADOW E&M-EST. PATIENT-LVL III: ICD-10-PCS | Mod: PBBFAC,,, | Performed by: ORTHOPAEDIC SURGERY

## 2022-10-26 PROCEDURE — 3044F HG A1C LEVEL LT 7.0%: CPT | Mod: CPTII,S$GLB,, | Performed by: ORTHOPAEDIC SURGERY

## 2022-10-26 PROCEDURE — 3044F PR MOST RECENT HEMOGLOBIN A1C LEVEL <7.0%: ICD-10-PCS | Mod: CPTII,S$GLB,, | Performed by: ORTHOPAEDIC SURGERY

## 2022-10-26 PROCEDURE — 1159F MED LIST DOCD IN RCRD: CPT | Mod: CPTII,S$GLB,, | Performed by: ORTHOPAEDIC SURGERY

## 2022-10-26 PROCEDURE — 99024 POSTOP FOLLOW-UP VISIT: CPT | Mod: S$GLB,,, | Performed by: ORTHOPAEDIC SURGERY

## 2022-10-26 PROCEDURE — 1159F PR MEDICATION LIST DOCUMENTED IN MEDICAL RECORD: ICD-10-PCS | Mod: CPTII,S$GLB,, | Performed by: ORTHOPAEDIC SURGERY

## 2022-10-26 PROCEDURE — 99999 PR PBB SHADOW E&M-EST. PATIENT-LVL III: CPT | Mod: PBBFAC,,, | Performed by: ORTHOPAEDIC SURGERY

## 2022-10-26 PROCEDURE — 99024 PR POST-OP FOLLOW-UP VISIT: ICD-10-PCS | Mod: S$GLB,,, | Performed by: ORTHOPAEDIC SURGERY

## 2022-10-26 NOTE — PROGRESS NOTES
Ms Vazquez returns to clinic today.  She is approximately 9 weeks status post right thumb CMC arthroplasty.  She is overall doing well.  She is working with therapy.      Physical exam:  Examination the right thumb reveals that all wounds are well healed.  There is no significant edema.  Palpation produces minimal tenderness.  She does have some stiffness of flexion with the thumb and some weakness noted.  She is neurovascularly intact.      Assessment:  Status post right thumb CMC arthroplasty     Plan:    1. She can wean out of the splint    2. She can begin to increase weight-bearing slowly    3.  She will follow up with me in 6 weeks for repeat evaluation

## 2022-11-08 ENCOUNTER — PATIENT MESSAGE (OUTPATIENT)
Dept: ORTHOPEDICS | Facility: CLINIC | Age: 57
End: 2022-11-08
Payer: COMMERCIAL

## 2022-11-22 ENCOUNTER — PATIENT MESSAGE (OUTPATIENT)
Dept: RHEUMATOLOGY | Facility: CLINIC | Age: 57
End: 2022-11-22
Payer: COMMERCIAL

## 2022-11-30 ENCOUNTER — PATIENT MESSAGE (OUTPATIENT)
Dept: RHEUMATOLOGY | Facility: CLINIC | Age: 57
End: 2022-11-30
Payer: COMMERCIAL

## 2022-11-30 ENCOUNTER — TELEPHONE (OUTPATIENT)
Dept: RHEUMATOLOGY | Facility: CLINIC | Age: 57
End: 2022-11-30
Payer: COMMERCIAL

## 2022-11-30 NOTE — TELEPHONE ENCOUNTER
----- Message from Coleen Anthony sent at 11/29/2022  2:32 PM CST -----  Contact: Patient  Type:  Sooner Apoointment Request      Name of Caller: patient     When is the first available appointment? N/a     Symptoms: referral     Would the patient rather a call back or a response via [facility]? Call     Best Call Back Number:[phone] (home)      Additional Information: Patient stated that she can come on Wednesday morning or anytime on Friday.

## 2022-12-07 ENCOUNTER — OFFICE VISIT (OUTPATIENT)
Dept: ORTHOPEDICS | Facility: CLINIC | Age: 57
End: 2022-12-07
Payer: COMMERCIAL

## 2022-12-07 ENCOUNTER — PATIENT MESSAGE (OUTPATIENT)
Dept: ORTHOPEDICS | Facility: CLINIC | Age: 57
End: 2022-12-07

## 2022-12-07 ENCOUNTER — OFFICE VISIT (OUTPATIENT)
Dept: ENDOCRINOLOGY | Facility: CLINIC | Age: 57
End: 2022-12-07
Payer: COMMERCIAL

## 2022-12-07 VITALS — BODY MASS INDEX: 25.39 KG/M2 | HEIGHT: 66 IN | WEIGHT: 158 LBS

## 2022-12-07 VITALS
HEIGHT: 67 IN | OXYGEN SATURATION: 97 % | HEART RATE: 74 BPM | SYSTOLIC BLOOD PRESSURE: 128 MMHG | BODY MASS INDEX: 25.4 KG/M2 | DIASTOLIC BLOOD PRESSURE: 80 MMHG | WEIGHT: 161.81 LBS

## 2022-12-07 DIAGNOSIS — E03.8 HYPOTHYROIDISM DUE TO HASHIMOTO'S THYROIDITIS: ICD-10-CM

## 2022-12-07 DIAGNOSIS — M18.11 ARTHRITIS OF CARPOMETACARPAL (CMC) JOINT OF RIGHT THUMB: Primary | ICD-10-CM

## 2022-12-07 DIAGNOSIS — K21.9 GASTROESOPHAGEAL REFLUX DISEASE, UNSPECIFIED WHETHER ESOPHAGITIS PRESENT: Primary | ICD-10-CM

## 2022-12-07 DIAGNOSIS — E06.3 HYPOTHYROIDISM DUE TO HASHIMOTO'S THYROIDITIS: ICD-10-CM

## 2022-12-07 PROCEDURE — 99213 PR OFFICE/OUTPT VISIT, EST, LEVL III, 20-29 MIN: ICD-10-PCS | Mod: S$GLB,,, | Performed by: ORTHOPAEDIC SURGERY

## 2022-12-07 PROCEDURE — 3044F PR MOST RECENT HEMOGLOBIN A1C LEVEL <7.0%: ICD-10-PCS | Mod: CPTII,S$GLB,, | Performed by: ORTHOPAEDIC SURGERY

## 2022-12-07 PROCEDURE — 3044F HG A1C LEVEL LT 7.0%: CPT | Mod: CPTII,S$GLB,, | Performed by: INTERNAL MEDICINE

## 2022-12-07 PROCEDURE — 99999 PR PBB SHADOW E&M-EST. PATIENT-LVL IV: ICD-10-PCS | Mod: PBBFAC,,, | Performed by: INTERNAL MEDICINE

## 2022-12-07 PROCEDURE — 1159F PR MEDICATION LIST DOCUMENTED IN MEDICAL RECORD: ICD-10-PCS | Mod: CPTII,S$GLB,, | Performed by: INTERNAL MEDICINE

## 2022-12-07 PROCEDURE — 1159F MED LIST DOCD IN RCRD: CPT | Mod: CPTII,S$GLB,, | Performed by: INTERNAL MEDICINE

## 2022-12-07 PROCEDURE — 1160F RVW MEDS BY RX/DR IN RCRD: CPT | Mod: CPTII,S$GLB,, | Performed by: INTERNAL MEDICINE

## 2022-12-07 PROCEDURE — 3008F PR BODY MASS INDEX (BMI) DOCUMENTED: ICD-10-PCS | Mod: CPTII,S$GLB,, | Performed by: INTERNAL MEDICINE

## 2022-12-07 PROCEDURE — 3079F DIAST BP 80-89 MM HG: CPT | Mod: CPTII,S$GLB,, | Performed by: INTERNAL MEDICINE

## 2022-12-07 PROCEDURE — 3008F BODY MASS INDEX DOCD: CPT | Mod: CPTII,S$GLB,, | Performed by: INTERNAL MEDICINE

## 2022-12-07 PROCEDURE — 99999 PR PBB SHADOW E&M-EST. PATIENT-LVL III: ICD-10-PCS | Mod: PBBFAC,,, | Performed by: ORTHOPAEDIC SURGERY

## 2022-12-07 PROCEDURE — 99999 PR PBB SHADOW E&M-EST. PATIENT-LVL IV: CPT | Mod: PBBFAC,,, | Performed by: INTERNAL MEDICINE

## 2022-12-07 PROCEDURE — 99999 PR PBB SHADOW E&M-EST. PATIENT-LVL III: CPT | Mod: PBBFAC,,, | Performed by: ORTHOPAEDIC SURGERY

## 2022-12-07 PROCEDURE — 1160F PR REVIEW ALL MEDS BY PRESCRIBER/CLIN PHARMACIST DOCUMENTED: ICD-10-PCS | Mod: CPTII,S$GLB,, | Performed by: INTERNAL MEDICINE

## 2022-12-07 PROCEDURE — 1159F MED LIST DOCD IN RCRD: CPT | Mod: CPTII,S$GLB,, | Performed by: ORTHOPAEDIC SURGERY

## 2022-12-07 PROCEDURE — 99214 PR OFFICE/OUTPT VISIT, EST, LEVL IV, 30-39 MIN: ICD-10-PCS | Mod: S$GLB,,, | Performed by: INTERNAL MEDICINE

## 2022-12-07 PROCEDURE — 3044F PR MOST RECENT HEMOGLOBIN A1C LEVEL <7.0%: ICD-10-PCS | Mod: CPTII,S$GLB,, | Performed by: INTERNAL MEDICINE

## 2022-12-07 PROCEDURE — 99213 OFFICE O/P EST LOW 20 MIN: CPT | Mod: S$GLB,,, | Performed by: ORTHOPAEDIC SURGERY

## 2022-12-07 PROCEDURE — 3044F HG A1C LEVEL LT 7.0%: CPT | Mod: CPTII,S$GLB,, | Performed by: ORTHOPAEDIC SURGERY

## 2022-12-07 PROCEDURE — 3074F PR MOST RECENT SYSTOLIC BLOOD PRESSURE < 130 MM HG: ICD-10-PCS | Mod: CPTII,S$GLB,, | Performed by: INTERNAL MEDICINE

## 2022-12-07 PROCEDURE — 1159F PR MEDICATION LIST DOCUMENTED IN MEDICAL RECORD: ICD-10-PCS | Mod: CPTII,S$GLB,, | Performed by: ORTHOPAEDIC SURGERY

## 2022-12-07 PROCEDURE — 3008F PR BODY MASS INDEX (BMI) DOCUMENTED: ICD-10-PCS | Mod: CPTII,S$GLB,, | Performed by: ORTHOPAEDIC SURGERY

## 2022-12-07 PROCEDURE — 99214 OFFICE O/P EST MOD 30 MIN: CPT | Mod: S$GLB,,, | Performed by: INTERNAL MEDICINE

## 2022-12-07 PROCEDURE — 3008F BODY MASS INDEX DOCD: CPT | Mod: CPTII,S$GLB,, | Performed by: ORTHOPAEDIC SURGERY

## 2022-12-07 PROCEDURE — 3074F SYST BP LT 130 MM HG: CPT | Mod: CPTII,S$GLB,, | Performed by: INTERNAL MEDICINE

## 2022-12-07 PROCEDURE — 3079F PR MOST RECENT DIASTOLIC BLOOD PRESSURE 80-89 MM HG: ICD-10-PCS | Mod: CPTII,S$GLB,, | Performed by: INTERNAL MEDICINE

## 2022-12-07 NOTE — PROGRESS NOTES
CHIEF COMPLAINT:  Hypothyroidism  57 y.o. old being seen as a new patient.  Started thyroid medication in early 30's. Currently on Synthroid 100 mcg daily. Dose recent increased based on July 2022 labs. States she has hair loss. Has dry skin. Has some fatigue. States has been having symptoms since prior to July labs. Has some improvement. Occasional palpitations. Has some insomnia. No increased anxiety. Awaiting Rheum appt for determination if has RA.  Taking by itself.       PAST MEDICAL HISTORY/PAST SURGICAL HISTORY:  Reviewed in [facility]    SOCIAL HISTORY: Reviewed in [facility]    FAMILY HISTORY:  NO known thyroid disease. No DM. Mother with RA.     MEDICATIONS/ALLERGIES: The patient's MedCard has been updated and reviewed.            PE:    GENERAL: Well developed, well nourished.  NECK: Supple, trachea midline, no palpable thyroid nodules  CHEST: Resp even and unlabored, CTA bilateral.  CARDIAC: RRR, S1, S2 heard, no murmurs, rubs, S3, or S4  SKIN: no acanthosis nigracans.    LABS/Radiology     Latest Reference Range & Units 05/27/20 09:30 07/27/21 08:10 07/14/22 08:38   TSH 0.400 - 4.000 uIU/mL 3.000 1.030 4.730 (H)   T3, Total 60 - 180 ng/dL  105    T3, Free 2.77 - 5.27 pg/mL 3.31  2.85   T4 Total 4.5 - 11.5 ug/dL 11.7 (H) 12.5 (H) 8.7   Free T4 0.78 - 2.19 ng/dL 1.46     (H): Data is abnormally high    ASSESSMENT/PLAN:  Hypothyroidism-appears doses were adjusted in July.  Still having hair loss, dry skin and fatigue.  She does have some insomnia which could contribute to fatigue.  Check TFTs as seen below.  She does appear to be taking it appropriately.  2. GERD-on PPI.  Take separate from thyroid medication      FOLLOWUP  Needs TSH, Ft4

## 2022-12-07 NOTE — PROGRESS NOTES
Ms Vazquez returns to clinic today.  She is status post right thumb CMC arthroplasty.  She is over 3 months out.  She is doing overall well.  Still has a few motions that cause her some pain.      Physical exam: Examination the right hand and wrist reveals that all wounds are well healed.  There is no edema.  Palpation does not produce significant tenderness.  She is able to touch the palm of the hand with her thumb and fully extend.  She does have sensation intact and capillary refill less than 2 seconds     Assessment: Status post right thumb CMC arthroplasty     Plan:    1. She can increase activity as tolerated    2.  She will follow up in 6 weeks for repeat evaluation mainly of her left thumb at which point I will consider a left thumb CMC injection

## 2022-12-08 ENCOUNTER — TELEPHONE (OUTPATIENT)
Dept: RHEUMATOLOGY | Facility: CLINIC | Age: 57
End: 2022-12-08
Payer: COMMERCIAL

## 2022-12-08 ENCOUNTER — PATIENT MESSAGE (OUTPATIENT)
Dept: RHEUMATOLOGY | Facility: CLINIC | Age: 57
End: 2022-12-08
Payer: COMMERCIAL

## 2022-12-08 NOTE — TELEPHONE ENCOUNTER
----- Message from Marie Estevez sent at 12/8/2022 12:57 PM CST -----  Contact: [phone]  Type: Needs Medical Advice  Who Called:  Pt     Best Call Back Number: [phone]  Additional Information: Pt calling to schedule a NP appt. Pt has referral on file.

## 2022-12-09 ENCOUNTER — PATIENT MESSAGE (OUTPATIENT)
Dept: RHEUMATOLOGY | Facility: CLINIC | Age: 57
End: 2022-12-09
Payer: COMMERCIAL

## 2022-12-11 ENCOUNTER — PATIENT MESSAGE (OUTPATIENT)
Dept: ENDOCRINOLOGY | Facility: CLINIC | Age: 57
End: 2022-12-11
Payer: COMMERCIAL

## 2022-12-11 ENCOUNTER — TELEPHONE (OUTPATIENT)
Dept: ENDOCRINOLOGY | Facility: CLINIC | Age: 57
End: 2022-12-11
Payer: COMMERCIAL

## 2022-12-21 PROBLEM — M18.11 ARTHRITIS OF CARPOMETACARPAL (CMC) JOINT OF RIGHT THUMB: Status: RESOLVED | Noted: 2022-08-25 | Resolved: 2022-12-21

## 2023-01-11 ENCOUNTER — PATIENT MESSAGE (OUTPATIENT)
Dept: RHEUMATOLOGY | Facility: CLINIC | Age: 58
End: 2023-01-11
Payer: COMMERCIAL

## 2023-01-13 DIAGNOSIS — M25.50 POLYARTHRALGIA: Primary | ICD-10-CM

## 2023-01-18 ENCOUNTER — OFFICE VISIT (OUTPATIENT)
Dept: ORTHOPEDICS | Facility: CLINIC | Age: 58
End: 2023-01-18
Payer: COMMERCIAL

## 2023-01-18 DIAGNOSIS — M18.0 PRIMARY ARTHROSIS OF FIRST CARPOMETACARPAL JOINTS, BILATERAL: Primary | ICD-10-CM

## 2023-01-18 PROCEDURE — 1159F PR MEDICATION LIST DOCUMENTED IN MEDICAL RECORD: ICD-10-PCS | Mod: CPTII,S$GLB,, | Performed by: ORTHOPAEDIC SURGERY

## 2023-01-18 PROCEDURE — 99213 OFFICE O/P EST LOW 20 MIN: CPT | Mod: 25,S$GLB,, | Performed by: ORTHOPAEDIC SURGERY

## 2023-01-18 PROCEDURE — 99213 PR OFFICE/OUTPT VISIT, EST, LEVL III, 20-29 MIN: ICD-10-PCS | Mod: 25,S$GLB,, | Performed by: ORTHOPAEDIC SURGERY

## 2023-01-18 PROCEDURE — 99999 PR PBB SHADOW E&M-EST. PATIENT-LVL III: CPT | Mod: PBBFAC,,, | Performed by: ORTHOPAEDIC SURGERY

## 2023-01-18 PROCEDURE — 99999 PR PBB SHADOW E&M-EST. PATIENT-LVL III: ICD-10-PCS | Mod: PBBFAC,,, | Performed by: ORTHOPAEDIC SURGERY

## 2023-01-18 PROCEDURE — 20600 DRAIN/INJ JOINT/BURSA W/O US: CPT | Mod: LT,S$GLB,, | Performed by: ORTHOPAEDIC SURGERY

## 2023-01-18 PROCEDURE — 20600 SMALL JOINT ASPIRATION/INJECTION: L THUMB CMC: ICD-10-PCS | Mod: LT,S$GLB,, | Performed by: ORTHOPAEDIC SURGERY

## 2023-01-18 PROCEDURE — 1159F MED LIST DOCD IN RCRD: CPT | Mod: CPTII,S$GLB,, | Performed by: ORTHOPAEDIC SURGERY

## 2023-01-18 RX ORDER — TRIAMCINOLONE ACETONIDE 40 MG/ML
40 INJECTION, SUSPENSION INTRA-ARTICULAR; INTRAMUSCULAR
Status: DISCONTINUED | OUTPATIENT
Start: 2023-01-18 | End: 2023-01-18 | Stop reason: HOSPADM

## 2023-01-18 RX ADMIN — TRIAMCINOLONE ACETONIDE 40 MG: 40 INJECTION, SUSPENSION INTRA-ARTICULAR; INTRAMUSCULAR at 08:01

## 2023-01-18 NOTE — PROGRESS NOTES
Ms Vazquez returns to clinic today.  She has a history of right thumb CMC arthroplasty which she is doing very well from.  She also has left thumb CMC arthritis which is causing her pain.      Physical exam: Examination the right hand and wrist reveals that the incision is well healed.  There is no edema.  Palpation does not produce tenderness.  She is able to flex to the palm.  She can fully extend.  She is neurovascularly intact.      Examination of the left hand reveals that there is no edema.  There are no skin changes.  Palpation does produce tenderness over the CMC joint of the thumb.  She has positive grind test.  She has sensation intact in a 2+ radial pulse     Assessment:  Status post right thumb CMC arthroplasty, left thumb CMC arthritis     Plan:    1.  She is allowed to return to activity as tolerated with the right hand    2.  After informed consent was obtained injection was placed to the left thumb CMC joint.  The patient tolerated that well    3.  Follow-up with me on a p.r.n. basis

## 2023-01-18 NOTE — PROCEDURES
Small Joint Aspiration/Injection: L thumb CMC    Date/Time: 1/18/2023 8:20 AM  Performed by: Miah Blanca MD  Authorized by: Miah Blanca MD     Consent Done?:  Yes (Verbal)  Indications:  Pain  Site marked: the procedure site was marked    Timeout: prior to procedure the correct patient, procedure, and site was verified    Local anesthetic:  Topical anesthetic  Location:  Thumb  Site:  L thumb CMC (Left thumb CMC joint)  Ultrasonic guidance for needle placement?: No    Needle size:  25 G  Medications:  40 mg triamcinolone acetonide 40 mg/mL; 40 mg triamcinolone acetonide 40 mg/mL (20 mg injected)  Patient tolerance:  Patient tolerated the procedure well with no immediate complications

## 2023-01-23 ENCOUNTER — PATIENT MESSAGE (OUTPATIENT)
Dept: RHEUMATOLOGY | Facility: CLINIC | Age: 58
End: 2023-01-23
Payer: COMMERCIAL

## 2023-03-14 ENCOUNTER — PATIENT MESSAGE (OUTPATIENT)
Dept: RHEUMATOLOGY | Facility: CLINIC | Age: 58
End: 2023-03-14
Payer: COMMERCIAL

## 2023-03-17 PROBLEM — J30.1 SEASONAL ALLERGIC RHINITIS DUE TO POLLEN: Status: ACTIVE | Noted: 2023-03-17

## 2023-03-17 PROBLEM — G47.30 SLEEP APNEA: Status: ACTIVE | Noted: 2023-03-17

## 2023-03-17 PROBLEM — G43.E09 CHRONIC MIGRAINE WITH AURA: Status: ACTIVE | Noted: 2023-03-17

## 2023-04-26 ENCOUNTER — OFFICE VISIT (OUTPATIENT)
Dept: RHEUMATOLOGY | Facility: CLINIC | Age: 58
End: 2023-04-26
Payer: COMMERCIAL

## 2023-04-26 VITALS
HEIGHT: 66 IN | BODY MASS INDEX: 24.91 KG/M2 | HEART RATE: 66 BPM | WEIGHT: 155 LBS | DIASTOLIC BLOOD PRESSURE: 70 MMHG | SYSTOLIC BLOOD PRESSURE: 120 MMHG

## 2023-04-26 DIAGNOSIS — M25.50 POLYARTHRALGIA: Primary | ICD-10-CM

## 2023-04-26 DIAGNOSIS — M06.9 RHEUMATOID ARTHRITIS, INVOLVING UNSPECIFIED SITE, UNSPECIFIED WHETHER RHEUMATOID FACTOR PRESENT: ICD-10-CM

## 2023-04-26 PROCEDURE — 99999 PR PBB SHADOW E&M-EST. PATIENT-LVL IV: ICD-10-PCS | Mod: PBBFAC,,, | Performed by: INTERNAL MEDICINE

## 2023-04-26 PROCEDURE — 3074F SYST BP LT 130 MM HG: CPT | Mod: CPTII,S$GLB,, | Performed by: INTERNAL MEDICINE

## 2023-04-26 PROCEDURE — 3008F BODY MASS INDEX DOCD: CPT | Mod: CPTII,S$GLB,, | Performed by: INTERNAL MEDICINE

## 2023-04-26 PROCEDURE — 99204 OFFICE O/P NEW MOD 45 MIN: CPT | Mod: S$GLB,,, | Performed by: INTERNAL MEDICINE

## 2023-04-26 PROCEDURE — 3008F PR BODY MASS INDEX (BMI) DOCUMENTED: ICD-10-PCS | Mod: CPTII,S$GLB,, | Performed by: INTERNAL MEDICINE

## 2023-04-26 PROCEDURE — 99204 PR OFFICE/OUTPT VISIT, NEW, LEVL IV, 45-59 MIN: ICD-10-PCS | Mod: S$GLB,,, | Performed by: INTERNAL MEDICINE

## 2023-04-26 PROCEDURE — 99999 PR PBB SHADOW E&M-EST. PATIENT-LVL IV: CPT | Mod: PBBFAC,,, | Performed by: INTERNAL MEDICINE

## 2023-04-26 PROCEDURE — 3074F PR MOST RECENT SYSTOLIC BLOOD PRESSURE < 130 MM HG: ICD-10-PCS | Mod: CPTII,S$GLB,, | Performed by: INTERNAL MEDICINE

## 2023-04-26 PROCEDURE — 3078F PR MOST RECENT DIASTOLIC BLOOD PRESSURE < 80 MM HG: ICD-10-PCS | Mod: CPTII,S$GLB,, | Performed by: INTERNAL MEDICINE

## 2023-04-26 PROCEDURE — 1159F PR MEDICATION LIST DOCUMENTED IN MEDICAL RECORD: ICD-10-PCS | Mod: CPTII,S$GLB,, | Performed by: INTERNAL MEDICINE

## 2023-04-26 PROCEDURE — 3078F DIAST BP <80 MM HG: CPT | Mod: CPTII,S$GLB,, | Performed by: INTERNAL MEDICINE

## 2023-04-26 PROCEDURE — 1159F MED LIST DOCD IN RCRD: CPT | Mod: CPTII,S$GLB,, | Performed by: INTERNAL MEDICINE

## 2023-04-26 RX ORDER — MAGNESIUM 30 MG
TABLET ORAL ONCE
COMMUNITY
End: 2024-03-08

## 2023-04-26 RX ORDER — CHOLECALCIFEROL (VITAMIN D3) 25 MCG
2000 TABLET ORAL DAILY
COMMUNITY

## 2023-04-26 NOTE — PROGRESS NOTES
Subjective:      Patient ID: Diane Vazquez is a 57 y.o. female.    Chief Complaint: Disease Management    HPI  57 year old with Shalom's esophagus, right trapezius removal (8/2022), Hashimoto's with hypothyroidism, smoking,pre-malignant melanoma, HL anxiety here for evaluation. She reports she started to have stiffness about a year ago diffusely.She has pain in shoulders, hands, knees, and ankles. Reports a month ago, she was having severe pain in left shoulder and couldn't raise her left arm. She took ibuprofen 800mg every 6 hours for a day and half and then went away.  It returned on right side last week and had trouble raising it. She took ibuprofen for a few days. Reports maybe some tightness in her hands. She diffuse stiffness in morning for 5 minutes. Denies any rashes, oral ulcers, photosensitivity. Reports raynauds since her 20s.  She has stiffness with prolonged sitting. She has more issues getting up due to knee pain.  She smokes 1/2 ppd since her 20s. Both parents for RA.   Denies personal or family history of psoriasis.   Mom: RA         Past Medical History:   Diagnosis Date    Anxiety state, unspecified     Madsen's esophagus 1990s    Last EGD ~2008, before she moved here from [place].     Early stage skin cancer     General anesthetics causing adverse effect in therapeutic use     Hypothyroidism     Other and unspecified hyperlipidemia     Pure hypercholesterolemia     Tobacco use disorder        Review of Systems   Constitutional:  Negative for fever and unexpected weight change.   HENT:  Negative for mouth sores and trouble swallowing.    Eyes:  Negative for redness.   Respiratory:  Negative for cough and shortness of breath.    Cardiovascular:  Negative for chest pain.   Gastrointestinal:  Negative for constipation and diarrhea.   Genitourinary:  Negative for dysuria and genital sores.   Skin:  Negative for rash.   Neurological:  Negative for headaches.   Hematological:  Does not bruise/bleed  "easily.  see HPI    Objective:   /70   Pulse 66   Ht 5' 6" (1.676 m)   Wt 70.3 kg (155 lb)   BMI 25.02 kg/m²   Physical Exam   Constitutional: She is oriented to person, place, and time.   HENT:   Head: Normocephalic and atraumatic.   Right Ear: External ear normal.   Left Ear: External ear normal.   Nose: Nose normal.   Mouth/Throat: Oropharynx is clear and moist. No oropharyngeal exudate.   Eyes: Pupils are equal, round, and reactive to light. Conjunctivae are normal. Right eye exhibits no discharge. Left eye exhibits no discharge. No scleral icterus.   Neck: No JVD present. No thyromegaly present.   Cardiovascular: Normal rate, regular rhythm and normal heart sounds. Exam reveals no gallop and no friction rub.   No murmur heard.  Pulmonary/Chest: Effort normal and breath sounds normal. No respiratory distress. She has no wheezes. She has no rales. She exhibits no tenderness.   Abdominal: Soft. Bowel sounds are normal. She exhibits no distension and no mass. There is no abdominal tenderness. There is no rebound and no guarding.   Musculoskeletal:         General: No tenderness.      Cervical back: Neck supple.   Lymphadenopathy:     She has no cervical adenopathy.   Neurological: She is alert and oriented to person, place, and time. No cranial nerve deficit. Gait normal. Coordination normal.   Skin: Skin is dry. No bruising and no rash noted. No erythema. No jaundice or pallor.   Psychiatric: Affect and judgment normal.     No data to display     Assessment:   57 year old with Shalom's esophagus, right trapezius removal (8/2022), Hashimoto's with hypothyroidism, smoking,pre-malignant melanoma, HL anxiety here for evaluation of +RF.  She has had episodes of shoulder pain that resolve on NSAIDS. I detect no synovitis today.  She has +RF and +CCP so I told her she could be having early RA or palindromic rheumatism.  If she has recurrent flares, we can consider treatment for RA.  1. Rheumatoid arthritis, " involving unspecified site, unspecified whether rheumatoid factor present          Plan:     Problem List Items Addressed This Visit    None  Visit Diagnoses       Rheumatoid arthritis, involving unspecified site, unspecified whether rheumatoid factor present              Labs  Xrays  45* minutes of total time spent on the encounter, which includes face to face time and non-face to face time preparing to see the patient (eg, review of tests), Obtaining and/or reviewing separately obtained history, Documenting clinical information in the electronic or other health record, Independently interpreting results (not separately reported) and communicating results to the patient/family/caregiver, or Care coordination (not separately reported).     Rtc in a year

## 2023-04-27 ENCOUNTER — PATIENT MESSAGE (OUTPATIENT)
Dept: RHEUMATOLOGY | Facility: CLINIC | Age: 58
End: 2023-04-27
Payer: COMMERCIAL

## 2023-06-28 ENCOUNTER — OFFICE VISIT (OUTPATIENT)
Dept: ORTHOPEDICS | Facility: CLINIC | Age: 58
End: 2023-06-28
Payer: COMMERCIAL

## 2023-06-28 VITALS — HEIGHT: 66 IN | WEIGHT: 155 LBS | BODY MASS INDEX: 24.91 KG/M2

## 2023-06-28 DIAGNOSIS — M18.12 ARTHRITIS OF CARPOMETACARPAL (CMC) JOINT OF LEFT THUMB: Primary | ICD-10-CM

## 2023-06-28 PROCEDURE — 20600 SMALL JOINT ASPIRATION/INJECTION: L THUMB CMC: ICD-10-PCS | Mod: LT,S$GLB,, | Performed by: ORTHOPAEDIC SURGERY

## 2023-06-28 PROCEDURE — 3008F PR BODY MASS INDEX (BMI) DOCUMENTED: ICD-10-PCS | Mod: CPTII,S$GLB,, | Performed by: ORTHOPAEDIC SURGERY

## 2023-06-28 PROCEDURE — 1159F MED LIST DOCD IN RCRD: CPT | Mod: CPTII,S$GLB,, | Performed by: ORTHOPAEDIC SURGERY

## 2023-06-28 PROCEDURE — 99213 PR OFFICE/OUTPT VISIT, EST, LEVL III, 20-29 MIN: ICD-10-PCS | Mod: 25,S$GLB,, | Performed by: ORTHOPAEDIC SURGERY

## 2023-06-28 PROCEDURE — 99999 PR PBB SHADOW E&M-EST. PATIENT-LVL III: ICD-10-PCS | Mod: PBBFAC,,, | Performed by: ORTHOPAEDIC SURGERY

## 2023-06-28 PROCEDURE — 99999 PR PBB SHADOW E&M-EST. PATIENT-LVL III: CPT | Mod: PBBFAC,,, | Performed by: ORTHOPAEDIC SURGERY

## 2023-06-28 PROCEDURE — 3008F BODY MASS INDEX DOCD: CPT | Mod: CPTII,S$GLB,, | Performed by: ORTHOPAEDIC SURGERY

## 2023-06-28 PROCEDURE — 20600 DRAIN/INJ JOINT/BURSA W/O US: CPT | Mod: LT,S$GLB,, | Performed by: ORTHOPAEDIC SURGERY

## 2023-06-28 PROCEDURE — 99213 OFFICE O/P EST LOW 20 MIN: CPT | Mod: 25,S$GLB,, | Performed by: ORTHOPAEDIC SURGERY

## 2023-06-28 PROCEDURE — 1159F PR MEDICATION LIST DOCUMENTED IN MEDICAL RECORD: ICD-10-PCS | Mod: CPTII,S$GLB,, | Performed by: ORTHOPAEDIC SURGERY

## 2023-06-28 RX ORDER — TRIAMCINOLONE ACETONIDE 40 MG/ML
40 INJECTION, SUSPENSION INTRA-ARTICULAR; INTRAMUSCULAR
Status: DISCONTINUED | OUTPATIENT
Start: 2023-06-28 | End: 2023-06-28 | Stop reason: HOSPADM

## 2023-06-28 RX ADMIN — TRIAMCINOLONE ACETONIDE 40 MG: 40 INJECTION, SUSPENSION INTRA-ARTICULAR; INTRAMUSCULAR at 08:06

## 2023-06-28 NOTE — PROCEDURES
Small Joint Aspiration/Injection: L thumb CMC    Date/Time: 6/28/2023 8:00 AM  Performed by: Miah Blanca MD  Authorized by: Miah Blanca MD     Consent Done?:  Yes (Verbal)  Indications:  Pain  Site marked: the procedure site was marked    Timeout: prior to procedure the correct patient, procedure, and site was verified    Local anesthetic:  Topical anesthetic  Location:  Thumb  Site:  L thumb CMC (Left thumb CMC joint)  Ultrasonic guidance for needle placement?: No    Needle size:  25 G  Medications:  40 mg triamcinolone acetonide 40 mg/mL; 40 mg triamcinolone acetonide 40 mg/mL (20 mg injected)  Patient tolerance:  Patient tolerated the procedure well with no immediate complications

## 2023-06-28 NOTE — PROGRESS NOTES
Ms Vazquez returns to clinic today.  She is status post right thumb CMC arthroplasty which has done well.  She continued to have left thumb CMC pain.  She was injected at her last visit which was approximately 6 months ago.  She is here today to discuss further treatment     Physical exam: Examination left hand reveals that there is no edema.  There is prominence of the CMC joint.  Palpation of the joint does produce mild to moderate tenderness.  Has a positive grind test.  She is neurovascularly intact.      Assessment: Left thumb CMC arthritis     Plan:    1.  After informed consent was obtained injection was placed to the left thumb CMC joint.  The patient tolerated that well    2.  Follow up with me on a p.r.n. basis

## 2023-09-26 ENCOUNTER — PATIENT MESSAGE (OUTPATIENT)
Dept: RHEUMATOLOGY | Facility: CLINIC | Age: 58
End: 2023-09-26
Payer: COMMERCIAL

## 2023-10-16 ENCOUNTER — PATIENT MESSAGE (OUTPATIENT)
Dept: RHEUMATOLOGY | Facility: CLINIC | Age: 58
End: 2023-10-16
Payer: COMMERCIAL

## 2023-12-08 ENCOUNTER — OFFICE VISIT (OUTPATIENT)
Dept: ORTHOPEDICS | Facility: CLINIC | Age: 58
End: 2023-12-08
Payer: COMMERCIAL

## 2023-12-08 VITALS — WEIGHT: 155 LBS | HEIGHT: 67 IN | BODY MASS INDEX: 24.33 KG/M2

## 2023-12-08 DIAGNOSIS — M18.12 ARTHRITIS OF CARPOMETACARPAL (CMC) JOINT OF LEFT THUMB: Primary | ICD-10-CM

## 2023-12-08 PROCEDURE — 1159F PR MEDICATION LIST DOCUMENTED IN MEDICAL RECORD: ICD-10-PCS | Mod: CPTII,S$GLB,, | Performed by: ORTHOPAEDIC SURGERY

## 2023-12-08 PROCEDURE — 99999 PR PBB SHADOW E&M-EST. PATIENT-LVL III: CPT | Mod: PBBFAC,,, | Performed by: ORTHOPAEDIC SURGERY

## 2023-12-08 PROCEDURE — 3008F PR BODY MASS INDEX (BMI) DOCUMENTED: ICD-10-PCS | Mod: CPTII,S$GLB,, | Performed by: ORTHOPAEDIC SURGERY

## 2023-12-08 PROCEDURE — 99499 NO LOS: ICD-10-PCS | Mod: S$GLB,,, | Performed by: ORTHOPAEDIC SURGERY

## 2023-12-08 PROCEDURE — 3008F BODY MASS INDEX DOCD: CPT | Mod: CPTII,S$GLB,, | Performed by: ORTHOPAEDIC SURGERY

## 2023-12-08 PROCEDURE — 99499 UNLISTED E&M SERVICE: CPT | Mod: S$GLB,,, | Performed by: ORTHOPAEDIC SURGERY

## 2023-12-08 PROCEDURE — 1159F MED LIST DOCD IN RCRD: CPT | Mod: CPTII,S$GLB,, | Performed by: ORTHOPAEDIC SURGERY

## 2023-12-08 PROCEDURE — 99999 PR PBB SHADOW E&M-EST. PATIENT-LVL III: ICD-10-PCS | Mod: PBBFAC,,, | Performed by: ORTHOPAEDIC SURGERY

## 2023-12-08 PROCEDURE — 20600 SMALL JOINT ASPIRATION/INJECTION: L THUMB CMC: ICD-10-PCS | Mod: LT,S$GLB,, | Performed by: ORTHOPAEDIC SURGERY

## 2023-12-08 PROCEDURE — 20600 DRAIN/INJ JOINT/BURSA W/O US: CPT | Mod: LT,S$GLB,, | Performed by: ORTHOPAEDIC SURGERY

## 2023-12-08 RX ORDER — TRIAMCINOLONE ACETONIDE 40 MG/ML
40 INJECTION, SUSPENSION INTRA-ARTICULAR; INTRAMUSCULAR
Status: DISCONTINUED | OUTPATIENT
Start: 2023-12-08 | End: 2023-12-08 | Stop reason: HOSPADM

## 2023-12-08 RX ADMIN — TRIAMCINOLONE ACETONIDE 40 MG: 40 INJECTION, SUSPENSION INTRA-ARTICULAR; INTRAMUSCULAR at 10:12

## 2023-12-08 NOTE — PROCEDURES
Small Joint Aspiration/Injection: L thumb CMC    Date/Time: 12/8/2023 10:20 AM    Performed by: Miah Blanca MD  Authorized by: Miah Blanca MD    Consent Done?:  Yes (Verbal)  Indications:  Pain  Site marked: the procedure site was marked    Timeout: prior to procedure the correct patient, procedure, and site was verified    Local anesthetic:  Topical anesthetic  Location:  Thumb  Site:  L thumb CMC (Left thumb CMC joint)  Ultrasonic guidance for needle placement?: No    Needle size:  25 G  Medications:  40 mg triamcinolone acetonide 40 mg/mL; 40 mg triamcinolone acetonide 40 mg/mL (20 mg injected)  Patient tolerance:  Patient tolerated the procedure well with no immediate complications

## 2023-12-08 NOTE — PROGRESS NOTES
Ms Vazquez returns to clinic today.  Has a history of left thumb CMC arthritis.  She is been injected in the past.  She has had a return of symptoms.      Physical exam: Examination left hand and wrist reveals that there are no skin changes.  There is mild edema of the CMC joint.  Palpation of the joint does produce tenderness.  She does have 2+ radial pulse and sensation is intact     Assessment:  Left thumb CMC arthritis     Plan:    1. After informed consent was obtained injection was placed to the left thumb CMC joint.    2.  She will follow up with me as needed

## 2024-02-23 ENCOUNTER — OFFICE VISIT (OUTPATIENT)
Dept: RHEUMATOLOGY | Facility: CLINIC | Age: 59
End: 2024-02-23
Payer: COMMERCIAL

## 2024-02-23 VITALS
HEIGHT: 67 IN | SYSTOLIC BLOOD PRESSURE: 125 MMHG | BODY MASS INDEX: 25.67 KG/M2 | DIASTOLIC BLOOD PRESSURE: 81 MMHG | WEIGHT: 163.56 LBS | HEART RATE: 64 BPM

## 2024-02-23 DIAGNOSIS — M12.30 PALINDROMIC RHEUMATISM: Primary | ICD-10-CM

## 2024-02-23 PROCEDURE — 99213 OFFICE O/P EST LOW 20 MIN: CPT | Mod: PBBFAC | Performed by: INTERNAL MEDICINE

## 2024-02-23 PROCEDURE — 99999 PR PBB SHADOW E&M-EST. PATIENT-LVL III: CPT | Mod: PBBFAC,,, | Performed by: INTERNAL MEDICINE

## 2024-02-23 PROCEDURE — 99214 OFFICE O/P EST MOD 30 MIN: CPT | Mod: S$PBB,,, | Performed by: INTERNAL MEDICINE

## 2024-02-23 RX ORDER — FOLIC ACID 1 MG/1
1 TABLET ORAL DAILY
Qty: 30 TABLET | Refills: 11 | Status: SHIPPED | OUTPATIENT
Start: 2024-02-23 | End: 2025-02-22

## 2024-02-23 RX ORDER — METHOTREXATE 2.5 MG/1
TABLET ORAL
Qty: 24 TABLET | Refills: 0 | Status: SHIPPED | OUTPATIENT
Start: 2024-02-23 | End: 2024-05-03

## 2024-02-23 NOTE — PROGRESS NOTES
2/20/2024    12:56 PM   Rapid3 Question Responses and Scores   MDHAQ Score 0.5   Psychologic Score 3.3   Pain Score 4.5   When you awakened in the morning OVER THE LAST WEEK, did you feel stiff? Yes   If Yes, please indicate the number of hours until you are as limber as you will be for the day 0.5   Fatigue Score 1   Global Health Score 3   RAPID3 Score 3.06     Answers submitted by the patient for this visit:  Rheumatology Questionnaire (Submitted on 2/20/2024)  fever: No  eye redness: No  mouth sores: No  headaches: Yes  shortness of breath: No  chest pain: No  trouble swallowing: No  diarrhea: No  constipation: No  unexpected weight change: No  genital sore: No  dysuria: No  During the last 3 days, have you had a skin rash?: No  Bruises or bleeds easily: No  cough: No

## 2024-02-23 NOTE — PROGRESS NOTES
Subjective:      Patient ID: Diane Vazquez is a 57 y.o. female.    Chief Complaint: Disease Management    HPI  57 year old with Shalom's esophagus, right trapezius removal (8/2022), Hashimoto's with hypothyroidism, smoking,pre-malignant melanoma, HL anxiety here for evaluation. She reports she started to have stiffness about a year ago diffusely.She has pain in shoulders, hands, knees, and ankles. Reports a month ago, she was having severe pain in left shoulder and couldn't raise her left arm. She took ibuprofen 800mg every 6 hours for a day and half and then went away.  It returned on right side last week and had trouble raising it. She took ibuprofen for a few days. Reports maybe some tightness in her hands. She diffuse stiffness in morning for 5 minutes. Denies any rashes, oral ulcers, photosensitivity. Reports raynauds since her 20s.  She has stiffness with prolonged sitting. She has more issues getting up due to knee pain.  She smokes 1/2 ppd since her 20s. Both parents for RA.   Denies personal or family history of psoriasis.   Mom: RA       Interval history: She has episodes of joint swelling  swelling. She has episodes of pain in left elbow and left shoulder. She gets pain in right shoulder.  She has episodes where she can't raise arm. She has episodes of  left foot pain.  It does continue to be episodic  She is having more episodes.       Past Medical History:   Diagnosis Date    Anxiety state, unspecified     Madsen's esophagus 1990s    Last EGD ~2008, before she moved here from [place].     Early stage skin cancer     General anesthetics causing adverse effect in therapeutic use     Hypothyroidism     Other and unspecified hyperlipidemia     Pure hypercholesterolemia     Tobacco use disorder        Review of Systems   Constitutional:  Negative for fever and unexpected weight change.   HENT:  Negative for mouth sores and trouble swallowing.    Eyes:  Negative for redness.   Respiratory:  Negative for cough  "and shortness of breath.    Cardiovascular:  Negative for chest pain.   Gastrointestinal:  Negative for constipation and diarrhea.   Genitourinary:  Negative for dysuria and genital sores.   Skin:  Negative for rash.   Neurological:  Negative for headaches.   Hematological:  Does not bruise/bleed easily.  see HPI    Objective:   /70   Pulse 66   Ht 5' 6" (1.676 m)   Wt 70.3 kg (155 lb)   BMI 25.02 kg/m²   Physical Exam   Constitutional: She is oriented to person, place, and time.   HENT:   Head: Normocephalic and atraumatic.   Right Ear: External ear normal.   Left Ear: External ear normal.   Nose: Nose normal.   Mouth/Throat: Oropharynx is clear and moist. No oropharyngeal exudate.   Eyes: Pupils are equal, round, and reactive to light. Conjunctivae are normal. Right eye exhibits no discharge. Left eye exhibits no discharge. No scleral icterus.   Neck: No JVD present. No thyromegaly present.   Cardiovascular: Normal rate, regular rhythm and normal heart sounds. Exam reveals no gallop and no friction rub.   No murmur heard.  Pulmonary/Chest: Effort normal and breath sounds normal. No respiratory distress. She has no wheezes. She has no rales. She exhibits no tenderness.   Abdominal: Soft. Bowel sounds are normal. She exhibits no distension and no mass. There is no abdominal tenderness. There is no rebound and no guarding.   Musculoskeletal:         General: No tenderness.      Cervical back: Neck supple.   Lymphadenopathy:     She has no cervical adenopathy.   Neurological: She is alert and oriented to person, place, and time. No cranial nerve deficit. Gait normal. Coordination normal.   Skin: Skin is dry. No bruising and no rash noted. No erythema. No jaundice or pallor.   Psychiatric: Affect and judgment normal.     No data to display     Assessment:   57 year old with Shalom's esophagus, right trapezius removal (8/2022), Hashimoto's with hypothyroidism, smoking,pre-malignant melanoma, HL anxiety here " for evaluation of +RF.  She has had more episodes of inflammatory arthritis. I discussed with patient consideration of MTX.She has +RF and +CCP so I told her she could be having early RA or palindromic rheumatism.        Plan:   #Palindromic rheumatism:  Labs        #Smoking: encourage smoking cessation   Rtc in 3 to 6 months

## 2024-02-27 ENCOUNTER — PATIENT MESSAGE (OUTPATIENT)
Dept: RHEUMATOLOGY | Facility: CLINIC | Age: 59
End: 2024-02-27
Payer: COMMERCIAL

## 2024-03-04 ENCOUNTER — PATIENT MESSAGE (OUTPATIENT)
Dept: RHEUMATOLOGY | Facility: CLINIC | Age: 59
End: 2024-03-04
Payer: COMMERCIAL

## 2024-03-05 ENCOUNTER — PATIENT MESSAGE (OUTPATIENT)
Dept: RHEUMATOLOGY | Facility: CLINIC | Age: 59
End: 2024-03-05
Payer: COMMERCIAL

## 2024-03-12 ENCOUNTER — PATIENT MESSAGE (OUTPATIENT)
Dept: RHEUMATOLOGY | Facility: CLINIC | Age: 59
End: 2024-03-12
Payer: COMMERCIAL

## 2024-03-12 DIAGNOSIS — M06.9 RHEUMATOID ARTHRITIS, INVOLVING UNSPECIFIED SITE, UNSPECIFIED WHETHER RHEUMATOID FACTOR PRESENT: Primary | ICD-10-CM

## 2024-03-12 RX ORDER — METHOTREXATE 2.5 MG/1
15 TABLET ORAL
Qty: 72 TABLET | Refills: 0 | Status: SHIPPED | OUTPATIENT
Start: 2024-03-12 | End: 2024-03-18 | Stop reason: SDUPTHER

## 2024-03-13 ENCOUNTER — PATIENT MESSAGE (OUTPATIENT)
Dept: RHEUMATOLOGY | Facility: CLINIC | Age: 59
End: 2024-03-13
Payer: COMMERCIAL

## 2024-03-16 ENCOUNTER — PATIENT MESSAGE (OUTPATIENT)
Dept: RHEUMATOLOGY | Facility: CLINIC | Age: 59
End: 2024-03-16
Payer: COMMERCIAL

## 2024-03-18 RX ORDER — METHOTREXATE 2.5 MG/1
15 TABLET ORAL
Qty: 72 TABLET | Refills: 0 | Status: SHIPPED | OUTPATIENT
Start: 2024-03-18 | End: 2025-03-18

## 2024-03-28 ENCOUNTER — PATIENT MESSAGE (OUTPATIENT)
Dept: RHEUMATOLOGY | Facility: CLINIC | Age: 59
End: 2024-03-28
Payer: COMMERCIAL

## 2024-04-16 ENCOUNTER — TELEPHONE (OUTPATIENT)
Dept: RHEUMATOLOGY | Facility: CLINIC | Age: 59
End: 2024-04-16
Payer: COMMERCIAL

## 2024-04-16 NOTE — TELEPHONE ENCOUNTER
Left message to call us back to make np appt.  ----- Message from Lu Berrios MD sent at 4/12/2024  1:04 PM CDT -----  Regarding: FW: patient with RA would like to transfer care from [facility] to [facility]  Re: MRN [id number]    Parvin, please help schedule patient for new patient appointment. Thank you!  ----- Message -----  From: Kaia Breen MD  Sent: 4/12/2024  10:06 AM CDT  To: Lu Berrios MD  Subject: patient with RA would like to transfer care #    Hello and good morning,   Patient has confirmed with RA on MTX; patient with significant pain. PCP Dr Heath, fellow co worker. She is willing to come for last minute appointments. I appreciate your care with our patients as always, I understand the overwhelming nature of your specialty.    Sincerely,  Kaia Breen MD

## 2024-04-24 ENCOUNTER — OFFICE VISIT (OUTPATIENT)
Dept: RHEUMATOLOGY | Facility: CLINIC | Age: 59
End: 2024-04-24
Payer: COMMERCIAL

## 2024-04-24 VITALS
HEART RATE: 66 BPM | WEIGHT: 159.81 LBS | HEIGHT: 66 IN | BODY MASS INDEX: 25.68 KG/M2 | SYSTOLIC BLOOD PRESSURE: 125 MMHG | DIASTOLIC BLOOD PRESSURE: 72 MMHG

## 2024-04-24 DIAGNOSIS — D84.821 IMMUNODEFICIENCY DUE TO DRUG THERAPY: ICD-10-CM

## 2024-04-24 DIAGNOSIS — M06.9 RHEUMATOID ARTHRITIS FLARE: Primary | ICD-10-CM

## 2024-04-24 DIAGNOSIS — Z79.899 IMMUNODEFICIENCY DUE TO DRUG THERAPY: ICD-10-CM

## 2024-04-24 PROCEDURE — 99214 OFFICE O/P EST MOD 30 MIN: CPT | Mod: S$GLB,,, | Performed by: INTERNAL MEDICINE

## 2024-04-24 PROCEDURE — 3074F SYST BP LT 130 MM HG: CPT | Mod: CPTII,S$GLB,, | Performed by: INTERNAL MEDICINE

## 2024-04-24 PROCEDURE — 1159F MED LIST DOCD IN RCRD: CPT | Mod: CPTII,S$GLB,, | Performed by: INTERNAL MEDICINE

## 2024-04-24 PROCEDURE — 99999 PR PBB SHADOW E&M-EST. PATIENT-LVL IV: CPT | Mod: PBBFAC,,, | Performed by: INTERNAL MEDICINE

## 2024-04-24 PROCEDURE — 3078F DIAST BP <80 MM HG: CPT | Mod: CPTII,S$GLB,, | Performed by: INTERNAL MEDICINE

## 2024-04-24 PROCEDURE — 3008F BODY MASS INDEX DOCD: CPT | Mod: CPTII,S$GLB,, | Performed by: INTERNAL MEDICINE

## 2024-04-24 NOTE — PROGRESS NOTES
Subjective:      Patient ID: Diane Vazquez is a 57 y.o. female.    Chief Complaint: Disease Management    HPI  57 year old with Shalom's esophagus, right trapezius removal (8/2022), Hashimoto's with hypothyroidism, smoking,pre-malignant melanoma, HL anxiety here for evaluation. She reports she started to have stiffness about a year ago diffusely.She has pain in shoulders, hands, knees, and ankles. Reports a month ago, she was having severe pain in left shoulder and couldn't raise her left arm. She took ibuprofen 800mg every 6 hours for a day and half and then went away.  It returned on right side last week and had trouble raising it. She took ibuprofen for a few days. Reports maybe some tightness in her hands. She diffuse stiffness in morning for 5 minutes. Denies any rashes, oral ulcers, photosensitivity. Reports raynauds since her 20s.  She has stiffness with prolonged sitting. She has more issues getting up due to knee pain.  She smokes 1/2 ppd since her 20s. Both parents for RA.   Denies personal or family history of psoriasis.   Mom: RA       Interval history: She has significant improvement in pain but just has stiffness. Denies joint swelling.  Pain level is 4/10 and she is very stiff in morning.  She is stiff in morning for up to an hour. She is stiffness with prolonged sitting. She has had 3 doses of MTX 6.    Past Medical History:   Diagnosis Date    Anxiety state, unspecified     Madsen's esophagus 1990s    Last EGD ~2008, before she moved here from [place].     Early stage skin cancer     General anesthetics causing adverse effect in therapeutic use     Hypothyroidism     Other and unspecified hyperlipidemia     Pure hypercholesterolemia     Tobacco use disorder        Review of Systems   Constitutional:  Negative for fever and unexpected weight change.   HENT:  Negative for mouth sores and trouble swallowing.    Eyes:  Negative for redness.   Respiratory:  Negative for cough and shortness of breath.   "  Cardiovascular:  Negative for chest pain.   Gastrointestinal:  Negative for constipation and diarrhea.   Genitourinary:  Negative for dysuria and genital sores.   Skin:  Negative for rash.   Neurological:  Negative for headaches.   Hematological:  Does not bruise/bleed easily.  see HPI    Objective:   /70   Pulse 66   Ht 5' 6" (1.676 m)   Wt 70.3 kg (155 lb)   BMI 25.02 kg/m²   Physical Exam   Constitutional: She is oriented to person, place, and time.   HENT:   Head: Normocephalic and atraumatic.   Right Ear: External ear normal.   Left Ear: External ear normal.   Nose: Nose normal.   Mouth/Throat: Oropharynx is clear and moist. No oropharyngeal exudate.   Eyes: Pupils are equal, round, and reactive to light. Conjunctivae are normal. Right eye exhibits no discharge. Left eye exhibits no discharge. No scleral icterus.   Neck: No JVD present. No thyromegaly present.   Cardiovascular: Normal rate, regular rhythm and normal heart sounds. Exam reveals no gallop and no friction rub.   No murmur heard.  Pulmonary/Chest: Effort normal and breath sounds normal. No respiratory distress. She has no wheezes. She has no rales. She exhibits no tenderness.   Abdominal: Soft. Bowel sounds are normal. She exhibits no distension and no mass. There is no abdominal tenderness. There is no rebound and no guarding.   Musculoskeletal:         General: No tenderness.      Cervical back: Neck supple.   Lymphadenopathy:     She has no cervical adenopathy.   Neurological: She is alert and oriented to person, place, and time. No cranial nerve deficit. Gait normal. Coordination normal.   Skin: Skin is dry. No bruising and no rash noted. No erythema. No jaundice or pallor.   Psychiatric: Affect and judgment normal.     No data to display     Assessment:   58  year old with Shalom's esophagus, right trapezius removal (8/2022), Hashimoto's with hypothyroidism, smoking,pre-malignant melanoma, HL anxiety here for follow up of " seropositive RA (+RF,+CCP).  She is doing much better on MTX 6 but is still flaring so plan will be to get labs and then increase MTX to 8.  She will contact me to schedule labs before we increase MTX to 20mg q week.  Plan:   #Seropositive RA:  Continue MTX 6 pills once  a week  Continue folic acid 1 mg po qday  Labs        #Smoking: encourage smoking cessation   Rtc in 3 months      30 * minutes of total time spent on the encounter, which includes face to face time and non-face to face time preparing to see the patient (eg, review of tests), Obtaining and/or reviewing separately obtained history, Documenting clinical information in the electronic or other health record, Independently interpreting results (not separately reported) and communicating results to the patient/family/caregiver, or Care coordination (not separately reported).

## 2024-04-24 NOTE — PROGRESS NOTES
4/24/2024     8:42 AM   Rapid3 Question Responses and Scores   MDHAQ Score 0.5   Psychologic Score 3.3   Pain Score 4.5   When you awakened in the morning OVER THE LAST WEEK, did you feel stiff? Yes   If Yes, please indicate the number of hours until you are as limber as you will be for the day 0.5   Fatigue Score 7   Global Health Score 6   RAPID3 Score 4.06     Answers submitted by the patient for this visit:  Rheumatology Questionnaire (Submitted on 4/24/2024)  fever: No  eye redness: No  mouth sores: No  headaches: Yes  shortness of breath: No  chest pain: No  trouble swallowing: No  diarrhea: No  constipation: No  unexpected weight change: No  genital sore: No  dysuria: No  During the last 3 days, have you had a skin rash?: No  Bruises or bleeds easily: No  cough: No

## 2024-05-01 ENCOUNTER — PATIENT MESSAGE (OUTPATIENT)
Dept: RHEUMATOLOGY | Facility: CLINIC | Age: 59
End: 2024-05-01
Payer: COMMERCIAL

## 2024-05-03 ENCOUNTER — PATIENT MESSAGE (OUTPATIENT)
Dept: RHEUMATOLOGY | Facility: CLINIC | Age: 59
End: 2024-05-03
Payer: COMMERCIAL

## 2024-05-03 ENCOUNTER — OFFICE VISIT (OUTPATIENT)
Dept: ORTHOPEDICS | Facility: CLINIC | Age: 59
End: 2024-05-03
Payer: COMMERCIAL

## 2024-05-03 VITALS — WEIGHT: 159.81 LBS | HEIGHT: 66 IN | BODY MASS INDEX: 25.68 KG/M2

## 2024-05-03 DIAGNOSIS — M18.12 ARTHRITIS OF CARPOMETACARPAL (CMC) JOINT OF LEFT THUMB: Primary | ICD-10-CM

## 2024-05-03 PROCEDURE — 20600 DRAIN/INJ JOINT/BURSA W/O US: CPT | Mod: LT,S$GLB,, | Performed by: PHYSICIAN ASSISTANT

## 2024-05-03 PROCEDURE — 99213 OFFICE O/P EST LOW 20 MIN: CPT | Mod: 25,S$GLB,, | Performed by: PHYSICIAN ASSISTANT

## 2024-05-03 PROCEDURE — 99999 PR PBB SHADOW E&M-EST. PATIENT-LVL III: CPT | Mod: PBBFAC,,, | Performed by: PHYSICIAN ASSISTANT

## 2024-05-03 PROCEDURE — 1160F RVW MEDS BY RX/DR IN RCRD: CPT | Mod: CPTII,S$GLB,, | Performed by: PHYSICIAN ASSISTANT

## 2024-05-03 PROCEDURE — 3008F BODY MASS INDEX DOCD: CPT | Mod: CPTII,S$GLB,, | Performed by: PHYSICIAN ASSISTANT

## 2024-05-03 PROCEDURE — 1159F MED LIST DOCD IN RCRD: CPT | Mod: CPTII,S$GLB,, | Performed by: PHYSICIAN ASSISTANT

## 2024-05-03 RX ORDER — TRIAMCINOLONE ACETONIDE 40 MG/ML
40 INJECTION, SUSPENSION INTRA-ARTICULAR; INTRAMUSCULAR
Status: DISCONTINUED | OUTPATIENT
Start: 2024-05-03 | End: 2024-05-03 | Stop reason: HOSPADM

## 2024-05-03 RX ADMIN — TRIAMCINOLONE ACETONIDE 40 MG: 40 INJECTION, SUSPENSION INTRA-ARTICULAR; INTRAMUSCULAR at 09:05

## 2024-05-03 NOTE — PROGRESS NOTES
5/3/2024    HPI:  Diane Vazquez is a 58 y.o. 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  States the injection she received approximately 5 months ago has worn off.  States he is here today to discuss further treatment options.  Denies any other complaints at this time.    PMHX:  Past Medical History:   Diagnosis Date    Anxiety state, unspecified     Madsen's esophagus 1990s    Last EGD ~2008, before she moved here from [place].     Early stage skin cancer     General anesthetics causing adverse effect in therapeutic use     Hypothyroidism     Other and unspecified hyperlipidemia     Pure hypercholesterolemia     Tobacco use disorder        PSHX:  Past Surgical History:   Procedure Laterality Date    CHOLECYSTECTOMY  ~2006    COLONOSCOPY  ~2004    Father with hx of colon cancer.  Had her first one at 38 y/o.    COLONOSCOPY N/A 11/04/2022    Procedure: COLONOSCOPY;  Surgeon: Levi Fry MD;  Location: [facility];  Service: Endoscopy;  Laterality: N/A;    ESOPHAGOGASTRODUODENOSCOPY  ~2008    Madsen's esophagus.    ESOPHAGOGASTRODUODENOSCOPY N/A 11/04/2022    Procedure: EGD (ESOPHAGOGASTRODUODENOSCOPY);  Surgeon: Levi Fry MD;  Location: [facility];  Service: Endoscopy;  Laterality: N/A;    HAND ARTHROPLASTY Right 08/25/2022    Procedure: Right thumb CMC arthroplasty;  Surgeon: Miah Blanca MD;  Location: [facility];  Service: Orthopedics;  Laterality: Right;       FMHX:  Family History   Problem Relation Name Age of Onset    Melanoma Mother Jeanell     Arthritis Mother Jeanell     Cancer Mother Jeanell     Heart disease Father Ruben     Hyperlipidemia Father Ruben     Hypertension Father Ruben     Cancer Father Ruben         colon, lungs    Other Daughter Katelyn         bowel disease    Depression Daughter Katelyn     Depression Maternal Grandmother [place]     Alcohol abuse Maternal Grandmother Ginny     Early death Maternal  Grandmother Ginny     Early death Maternal Grandfather Evaristo     Breast cancer Paternal Grandmother Dilma         50's    Cancer Paternal Grandmother Dilma     Early death Paternal Grandmother Dilma     Mental illness Brother      Mental illness Brother Robert        SOCHX:  Social History     Tobacco Use    Smoking status: Every Day     Current packs/day: 0.50     Average packs/day: 0.5 packs/day for 49.3 years (24.7 ttl pk-yrs)     Types: Cigarettes     Start date: 1984    Smokeless tobacco: Never   Substance Use Topics    Alcohol use: Yes     Alcohol/week: 2.0 standard drinks of alcohol     Types: 1 Glasses of wine, 1 Shots of liquor per week     Comment: 2 to 3 times a month wine/mixed drink       ALLERGIES:  Mushroom and Meloxicam    CURRENT MEDICATIONS:  Current Outpatient Medications on File Prior to Visit   Medication Sig Dispense Refill    albuterol (PROVENTIL/VENTOLIN HFA) 90 mcg/actuation inhaler Inhale 1-2 puffs into the lungs every 6 (six) hours as needed for Wheezing or Shortness of Breath. Rescue 18 g 1    buPROPion (WELLBUTRIN) 75 MG tablet Take 1 tablet (75 mg total) by mouth 2 (two) times daily. 180 tablet 3    cyanocobalamin 1,000 mcg/mL injection Inject 1 mL (1,000 mcg total) into the skin every 28 days. 4 mL 2    ezetimibe (ZETIA) 10 mg tablet Take 1 tablet (10 mg total) by mouth once daily. 90 tablet 3    fluticasone propionate (FLONASE) 50 mcg/actuation nasal spray 1 spray (50 mcg total) by Each Nostril route 2 (two) times daily. 16 mL 3    folic acid (FOLVITE) 1 MG tablet Take 1 tablet (1 mg total) by mouth once daily. 30 tablet 11    ibuprofen (ADVIL,MOTRIN) 800 MG tablet TAKE 1 TABLET BY MOUTH EVERY 6 HOURS AS NEEDED FOR PAIN. 30 tablet 0    levothyroxine (SYNTHROID) 100 MCG tablet Take 1 tablet (100 mcg total) by mouth before breakfast. 90 tablet 3    LIDOcaine (LIDODERM) 5 % Place 1 patch onto the skin once daily. Remove & Discard patch within 12 hours or as directed by MD 30  "patch 1    methotrexate 2.5 MG Tab Take 6 tablets (15 mg total) by mouth every 7 days. 72 tablet 0    multivitamin with minerals tablet Take 1 tablet by mouth once daily.      omeprazole (PRILOSEC) 20 MG capsule Take 2 capsules (40 mg total) by mouth once daily. 90 capsule 1    TURMERIC ORAL Take by mouth.      vitamin D (VITAMIN D3) 1000 units Tab Take 2,000 Units by mouth once daily.      ALPRAZolam (XANAX) 0.25 MG tablet Take 1 tablet (0.25 mg total) by mouth nightly as needed for Anxiety. 20 tablet 0    [DISCONTINUED] methotrexate 2.5 MG Tab Take 4 pills once a  week for the first 2 weeks and then increase to 6 pills once a week 24 tablet 0     Current Facility-Administered Medications on File Prior to Visit   Medication Dose Route Frequency Provider Last Rate Last Admin    lactated ringers infusion   Intravenous Continuous Carmenza Barrios MD   Stopped at 08/25/22 9892       REVIEW OF SYSTEMS:  Review of Systems Complete; Negative, unless noted above.    GENERAL PHYSICAL EXAM:   Ht 5' 6" (1.676 m)   Wt 72.5 kg (159 lb 13.3 oz)   BMI 25.80 kg/m²    GEN: well developed, well nourished, no acute distress   PULM: No wheezing, no respiratory distress   CV: RRR    ORTHO EXAM:   Examination of the left hand/thumb reveals mild edema of the 1st CMC joint.  No erythema, ecchymosis, or skin breakdown.  Tenderness palpation of the 1st CMC joint.  Sensation is grossly intact in the radial, ulnar, median nerve distributions.  Capillary refill less than 2 seconds.    RADIOLOGY:   None.    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 I discussed with Diane Vazquez that considering the injection she received did a previous he was fitted provided a significant amount of relief, the best course of action this time is perform a repeat steroid injection to the 1st CMC joint of the left hand/thumb in clinic today.  She verbally agreed with the treatment plan     2. Informed consent was " obtained.  After an alcohol prep followed by chlorhexidine prep, a steroid injection was placed into the 1st CMC joint of the left hand/thumb.  She tolerated the procedure well with no immediate complications.    3. I would like her follow up in clinic on a p.r.n. basis for any worsening/returning of her symptoms or for any hand, wrist, or elbow problems/concerns.  She was instructed to contact the clinic for any problems or concerns in the interim.

## 2024-05-03 NOTE — PROCEDURES
Small Joint Aspiration/Injection: L thumb CMC    Date/Time: 5/3/2024 9:20 AM    Performed by: Rm Finch PA-C  Authorized by: Rm Finch PA-C    Consent Done?:  Yes (Verbal)  Indications:  Arthritis and pain  Site marked: the procedure site was marked    Timeout: prior to procedure the correct patient, procedure, and site was verified    Prep: patient was prepped and draped in usual sterile fashion      Local anesthetic:  Topical anesthetic  Location:  Thumb  Site:  L thumb CMC  Ultrasonic guidance for needle placement?: No    Needle size:  25 G  Approach:  Radial  Medications:  40 mg triamcinolone acetonide 40 mg/mL (40 mg injected)  Patient tolerance:  Patient tolerated the procedure well with no immediate complications

## 2024-05-05 ENCOUNTER — PATIENT MESSAGE (OUTPATIENT)
Dept: RHEUMATOLOGY | Facility: CLINIC | Age: 59
End: 2024-05-05
Payer: COMMERCIAL

## 2024-05-08 DIAGNOSIS — M06.9 RHEUMATOID ARTHRITIS FLARE: Primary | ICD-10-CM

## 2024-05-08 RX ORDER — ADALIMUMAB 40MG/0.4ML
40 KIT SUBCUTANEOUS
Qty: 2 PEN | Refills: 11 | Status: ACTIVE | OUTPATIENT
Start: 2024-05-08 | End: 2024-07-31

## 2024-05-14 ENCOUNTER — PATIENT MESSAGE (OUTPATIENT)
Dept: RHEUMATOLOGY | Facility: CLINIC | Age: 59
End: 2024-05-14
Payer: COMMERCIAL

## 2024-06-25 ENCOUNTER — TELEPHONE (OUTPATIENT)
Dept: RHEUMATOLOGY | Facility: CLINIC | Age: 59
End: 2024-06-25
Payer: COMMERCIAL

## 2024-06-25 NOTE — TELEPHONE ENCOUNTER
----- Message from Jac Cruz sent at 6/24/2024  3:54 PM CDT -----  Contact: Rahul/ Pharmacy  Type:  Pharmacy Calling to Clarify an RX    Name of Caller:Rahul   Pharmacy Name:  pharmacy   Prescription Name:adalimumab (HUMIRA,CF, PEN) 40 mg/0.4 mL PnKt  What do they need to clarify?: they are needing provider's email on file since filling this medication because they send an eval at the end of year  Best Call Back Number:[phone]  Additional Information:   Thanks   Am

## 2024-06-25 NOTE — TELEPHONE ENCOUNTER
Spoke w/ Coleen at the pharmacy, and provided the email. She states that she will give to Rahul upon his return tomorrow.

## 2024-07-09 ENCOUNTER — PATIENT MESSAGE (OUTPATIENT)
Dept: RHEUMATOLOGY | Facility: CLINIC | Age: 59
End: 2024-07-09
Payer: COMMERCIAL

## 2024-07-15 DIAGNOSIS — M06.9 RHEUMATOID ARTHRITIS FLARE: Primary | ICD-10-CM

## 2024-08-09 ENCOUNTER — OFFICE VISIT (OUTPATIENT)
Dept: ORTHOPEDICS | Facility: CLINIC | Age: 59
End: 2024-08-09
Payer: COMMERCIAL

## 2024-08-09 VITALS — WEIGHT: 154 LBS | HEIGHT: 67 IN | BODY MASS INDEX: 24.17 KG/M2

## 2024-08-09 DIAGNOSIS — M18.12 ARTHRITIS OF CARPOMETACARPAL (CMC) JOINT OF LEFT THUMB: Primary | ICD-10-CM

## 2024-08-09 PROCEDURE — 99999 PR PBB SHADOW E&M-EST. PATIENT-LVL III: CPT | Mod: PBBFAC,,, | Performed by: ORTHOPAEDIC SURGERY

## 2024-08-09 RX ORDER — TRIAMCINOLONE ACETONIDE 40 MG/ML
40 INJECTION, SUSPENSION INTRA-ARTICULAR; INTRAMUSCULAR
Status: DISCONTINUED | OUTPATIENT
Start: 2024-08-09 | End: 2024-08-09 | Stop reason: HOSPADM

## 2024-08-09 RX ADMIN — TRIAMCINOLONE ACETONIDE 40 MG: 40 INJECTION, SUSPENSION INTRA-ARTICULAR; INTRAMUSCULAR at 11:08

## 2024-08-12 ENCOUNTER — OFFICE VISIT (OUTPATIENT)
Dept: RHEUMATOLOGY | Facility: CLINIC | Age: 59
End: 2024-08-12
Payer: COMMERCIAL

## 2024-08-12 VITALS
SYSTOLIC BLOOD PRESSURE: 147 MMHG | HEIGHT: 67 IN | DIASTOLIC BLOOD PRESSURE: 80 MMHG | BODY MASS INDEX: 24.53 KG/M2 | HEART RATE: 57 BPM | WEIGHT: 156.31 LBS

## 2024-08-12 DIAGNOSIS — M06.9 RHEUMATOID ARTHRITIS FLARE: Primary | ICD-10-CM

## 2024-08-12 DIAGNOSIS — Z79.899 IMMUNODEFICIENCY DUE TO DRUG THERAPY: ICD-10-CM

## 2024-08-12 DIAGNOSIS — F17.200 SMOKING: ICD-10-CM

## 2024-08-12 DIAGNOSIS — D84.821 IMMUNODEFICIENCY DUE TO DRUG THERAPY: ICD-10-CM

## 2024-08-12 PROCEDURE — 99999 PR PBB SHADOW E&M-EST. PATIENT-LVL III: CPT | Mod: PBBFAC,,, | Performed by: INTERNAL MEDICINE

## 2024-08-12 PROCEDURE — 1159F MED LIST DOCD IN RCRD: CPT | Mod: CPTII,S$GLB,, | Performed by: INTERNAL MEDICINE

## 2024-08-12 PROCEDURE — 3077F SYST BP >= 140 MM HG: CPT | Mod: CPTII,S$GLB,, | Performed by: INTERNAL MEDICINE

## 2024-08-12 PROCEDURE — 99215 OFFICE O/P EST HI 40 MIN: CPT | Mod: S$GLB,,, | Performed by: INTERNAL MEDICINE

## 2024-08-12 PROCEDURE — 3008F BODY MASS INDEX DOCD: CPT | Mod: CPTII,S$GLB,, | Performed by: INTERNAL MEDICINE

## 2024-08-12 PROCEDURE — 3079F DIAST BP 80-89 MM HG: CPT | Mod: CPTII,S$GLB,, | Performed by: INTERNAL MEDICINE

## 2024-08-12 RX ORDER — METHOTREXATE 2.5 MG/1
10 TABLET ORAL
Qty: 16 TABLET | Refills: 0 | Status: SHIPPED | OUTPATIENT
Start: 2024-08-12 | End: 2025-08-12

## 2024-08-12 NOTE — PROGRESS NOTES
8/5/2024    10:15 AM   Rapid3 Question Responses and Scores   MDHAQ Score 0.6   Psychologic Score 1.1   Pain Score 6   When you awakened in the morning OVER THE LAST WEEK, did you feel stiff? Yes   If Yes, please indicate the number of hours until you are as limber as you will be for the day 1   Fatigue Score 1.5   Global Health Score 4   RAPID3 Score 4     Answers submitted by the patient for this visit:  Rheumatology Questionnaire (Submitted on 8/5/2024)  fever: No  eye redness: No  mouth sores: No  headaches: Yes  shortness of breath: No  chest pain: No  trouble swallowing: No  diarrhea: No  constipation: No  unexpected weight change: No  genital sore: No  dysuria: No  During the last 3 days, have you had a skin rash?: No  Bruises or bleeds easily: No  cough: No

## 2024-08-12 NOTE — PROGRESS NOTES
Subjective:      Patient ID: Diane Vazquez is a 57 y.o. female.    Chief Complaint: Disease Management    HPI  57 year old with Shalom's esophagus, right trapezius removal (8/2022), Hashimoto's with hypothyroidism, smoking,pre-malignant melanoma, HL anxiety here for evaluation. She reports she started to have stiffness about a year ago diffusely.She has pain in shoulders, hands, knees, and ankles. Reports a month ago, she was having severe pain in left shoulder and couldn't raise her left arm. She took ibuprofen 800mg every 6 hours for a day and half and then went away.  It returned on right side last week and had trouble raising it. She took ibuprofen for a few days. Reports maybe some tightness in her hands. She diffuse stiffness in morning for 5 minutes. Denies any rashes, oral ulcers, photosensitivity. Reports raynauds since her 20s.  She has stiffness with prolonged sitting. She has more issues getting up due to knee pain.  She smokes 1/2 ppd since her 20s. Both parents for RA.   Denies personal or family history of psoriasis.   Mom: RA       Interval history:  She has been on Humira for 3 months. Pain level is 6/10. She has pain in left ankle, both feet, hips and hands.  Denies joint swelling. She would like to be on MTX.     Past Medical History:   Diagnosis Date    Anxiety state, unspecified     Madsen's esophagus 1990s    Last EGD ~2008, before she moved here from [place].     Early stage skin cancer     General anesthetics causing adverse effect in therapeutic use     Hypothyroidism     Other and unspecified hyperlipidemia     Pure hypercholesterolemia     Tobacco use disorder        Review of Systems   Constitutional:  Negative for fever and unexpected weight change.   HENT:  Negative for mouth sores and trouble swallowing.    Eyes:  Negative for redness.   Respiratory:  Negative for cough and shortness of breath.    Cardiovascular:  Negative for chest pain.   Gastrointestinal:  Negative for constipation  "and diarrhea.   Genitourinary:  Negative for dysuria and genital sores.   Skin:  Negative for rash.   Neurological:  Negative for headaches.   Hematological:  Does not bruise/bleed easily.  see HPI    Objective:   /70   Pulse 66   Ht 5' 6" (1.676 m)   Wt 70.3 kg (155 lb)   BMI 25.02 kg/m²   Physical Exam   Constitutional: She is oriented to person, place, and time.   HENT:   Head: Normocephalic and atraumatic.   Right Ear: External ear normal.   Left Ear: External ear normal.   Nose: Nose normal.   Mouth/Throat: Oropharynx is clear and moist. No oropharyngeal exudate.   Eyes: Pupils are equal, round, and reactive to light. Conjunctivae are normal. Right eye exhibits no discharge. Left eye exhibits no discharge. No scleral icterus.   Neck: No JVD present. No thyromegaly present.   Cardiovascular: Normal rate, regular rhythm and normal heart sounds. Exam reveals no gallop and no friction rub.   No murmur heard.  Pulmonary/Chest: Effort normal and breath sounds normal. No respiratory distress. She has no wheezes. She has no rales. She exhibits no tenderness.   Abdominal: Soft. Bowel sounds are normal. She exhibits no distension and no mass. There is no abdominal tenderness. There is no rebound and no guarding.   Musculoskeletal:         General: No tenderness.      Cervical back: Neck supple.   Lymphadenopathy:     She has no cervical adenopathy.   Neurological: She is alert and oriented to person, place, and time. No cranial nerve deficit. Gait normal. Coordination normal.   Skin: Skin is dry. No bruising and no rash noted. No erythema. No jaundice or pallor.   Psychiatric: Affect and judgment normal.     No data to display     Assessment:   59 year old with Shalom's esophagus, right trapezius removal (8/2022), Hashimoto's with hypothyroidism, smoking,pre-malignant melanoma, HL anxiety here for follow up of seropositive RA (+RF,+CCP).  She is doing much better on MTX 6 but is still flaring so plan will be to " get labs and then increase MTX to 8.  She will contact me to schedule labs before we increase MTX to 20mg q week.  Plan:   #Seropositive RA: still having synovitis on Humira.  Add low dose MTX 4 pills once a week ( monitor  LFTS)  Continue folic acid 1 mg po qday  Labs in a month         Immunodeficiency due to drug-   -monitor carefully for infection and any toxicities associated with immunosuppressants  -advised age appropriate cancer screenings including yearly skin exam and age appropriate vaccinations  -advised to seek immediate care in the setting of infection and hold immunosuppressive medications if there is infection    #Smoking: encourage smoking cessation   Rtc in 3 months      30 * minutes of total time spent on the encounter, which includes face to face time and non-face to face time preparing to see the patient (eg, review of tests), Obtaining and/or reviewing separately obtained history, Documenting clinical information in the electronic or other health record, Independently interpreting results (not separately reported) and communicating results to the patient/family/caregiver, or Care coordination (not separately reported).

## 2024-09-06 ENCOUNTER — TELEPHONE (OUTPATIENT)
Dept: RHEUMATOLOGY | Facility: CLINIC | Age: 59
End: 2024-09-06
Payer: COMMERCIAL

## 2024-09-06 NOTE — TELEPHONE ENCOUNTER
Left message regarding new pt appt and her appt was scheduled incorrectly. Advise to call us back and we can put in a new pt slot.

## 2024-10-30 ENCOUNTER — OFFICE VISIT (OUTPATIENT)
Dept: RHEUMATOLOGY | Facility: CLINIC | Age: 59
End: 2024-10-30
Payer: COMMERCIAL

## 2024-10-30 VITALS
BODY MASS INDEX: 23.76 KG/M2 | HEART RATE: 80 BPM | DIASTOLIC BLOOD PRESSURE: 79 MMHG | SYSTOLIC BLOOD PRESSURE: 123 MMHG | WEIGHT: 151.69 LBS

## 2024-10-30 DIAGNOSIS — Z79.899 HIGH RISK MEDICATIONS (NOT ANTICOAGULANTS) LONG-TERM USE: ICD-10-CM

## 2024-10-30 DIAGNOSIS — M06.9 RHEUMATOID ARTHRITIS, INVOLVING UNSPECIFIED SITE, UNSPECIFIED WHETHER RHEUMATOID FACTOR PRESENT: Primary | ICD-10-CM

## 2024-10-30 DIAGNOSIS — D84.821 DRUG-INDUCED IMMUNODEFICIENCY: ICD-10-CM

## 2024-10-30 DIAGNOSIS — Z79.899 DRUG-INDUCED IMMUNODEFICIENCY: ICD-10-CM

## 2024-10-30 DIAGNOSIS — F17.200 SMOKING: ICD-10-CM

## 2024-10-30 DIAGNOSIS — M06.9 RHEUMATOID ARTHRITIS FLARE: ICD-10-CM

## 2024-10-30 PROCEDURE — 3008F BODY MASS INDEX DOCD: CPT | Mod: CPTII,S$GLB,, | Performed by: STUDENT IN AN ORGANIZED HEALTH CARE EDUCATION/TRAINING PROGRAM

## 2024-10-30 PROCEDURE — 99215 OFFICE O/P EST HI 40 MIN: CPT | Mod: S$GLB,,, | Performed by: STUDENT IN AN ORGANIZED HEALTH CARE EDUCATION/TRAINING PROGRAM

## 2024-10-30 PROCEDURE — 3074F SYST BP LT 130 MM HG: CPT | Mod: CPTII,S$GLB,, | Performed by: STUDENT IN AN ORGANIZED HEALTH CARE EDUCATION/TRAINING PROGRAM

## 2024-10-30 PROCEDURE — 1159F MED LIST DOCD IN RCRD: CPT | Mod: CPTII,S$GLB,, | Performed by: STUDENT IN AN ORGANIZED HEALTH CARE EDUCATION/TRAINING PROGRAM

## 2024-10-30 PROCEDURE — 99999 PR PBB SHADOW E&M-EST. PATIENT-LVL IV: CPT | Mod: PBBFAC,,, | Performed by: STUDENT IN AN ORGANIZED HEALTH CARE EDUCATION/TRAINING PROGRAM

## 2024-10-30 PROCEDURE — 3078F DIAST BP <80 MM HG: CPT | Mod: CPTII,S$GLB,, | Performed by: STUDENT IN AN ORGANIZED HEALTH CARE EDUCATION/TRAINING PROGRAM

## 2024-10-30 PROCEDURE — 1160F RVW MEDS BY RX/DR IN RCRD: CPT | Mod: CPTII,S$GLB,, | Performed by: STUDENT IN AN ORGANIZED HEALTH CARE EDUCATION/TRAINING PROGRAM

## 2024-10-30 RX ORDER — FOLIC ACID 1 MG/1
1 TABLET ORAL DAILY
Qty: 90 TABLET | Refills: 3 | Status: SHIPPED | OUTPATIENT
Start: 2024-10-30 | End: 2025-10-30

## 2024-10-30 RX ORDER — METHOTREXATE 2.5 MG/1
5 TABLET ORAL
Qty: 24 TABLET | Refills: 3 | Status: SHIPPED | OUTPATIENT
Start: 2024-10-30 | End: 2025-10-30

## 2024-10-30 RX ORDER — PREDNISONE 5 MG/1
5 TABLET ORAL DAILY
Qty: 90 TABLET | Refills: 3 | Status: SHIPPED | OUTPATIENT
Start: 2024-10-30 | End: 2025-10-30

## 2024-10-30 RX ORDER — ABATACEPT 125 MG/ML
125 INJECTION, SOLUTION SUBCUTANEOUS
Qty: 12 EACH | Refills: 3 | Status: ACTIVE | OUTPATIENT
Start: 2024-10-30 | End: 2025-10-30

## 2024-10-30 ASSESSMENT — ROUTINE ASSESSMENT OF PATIENT INDEX DATA (RAPID3)
PSYCHOLOGICAL DISTRESS SCORE: 2.2
PAIN SCORE: 6
TOTAL RAPID3 SCORE: 5.33
PATIENT GLOBAL ASSESSMENT SCORE: 7
FATIGUE SCORE: 2.2
MDHAQ FUNCTION SCORE: 0.9

## 2024-11-05 ENCOUNTER — TELEPHONE (OUTPATIENT)
Dept: RHEUMATOLOGY | Facility: CLINIC | Age: 59
End: 2024-11-05
Payer: COMMERCIAL

## 2024-11-05 NOTE — TELEPHONE ENCOUNTER
Informed Rahul that she when she takes her next injection she can start her Orencia. No wait time. States understanding.  ----- Message from Angie sent at 11/5/2024 10:25 AM CST -----  Contact: Rahul from [facility]  Type:  Pharmacy Calling to Clarify an RX    Name of Caller:  Rahul from [facility]    Pharmacy Name:      [facility]. - [address] [address]  Phone: [phone] Fax: [phone]      Prescription Name:  abatacept (ORENCIA CLICKJECT) 125 mg/mL AtIn   [[id number]]    What do they need to clarify?:  Begin taking ORENCIA    Best Call Back Number:   [phone] - Rahul    Additional Information:  States patient got HUMIRA and took her dose - states needs to know how long patient needs to wait between taking a dose of HUMIRA and starting ORENCIA - please call - thank you

## 2024-11-07 ENCOUNTER — TELEPHONE (OUTPATIENT)
Dept: RHEUMATOLOGY | Facility: CLINIC | Age: 59
End: 2024-11-07
Payer: COMMERCIAL

## 2024-11-07 NOTE — TELEPHONE ENCOUNTER
Spoke to Rahul and wanted to clarify when to get vaccines in between taking her injections. States understanding  ----- Message from Alanna sent at 11/7/2024  9:28 AM CST -----  Type:  Pharmacy Calling to Clarify an RX    Name of Caller:pharmacy  Pharmacy Name:  [facility] [address] [id number] [facility] [address]  Phone: [phone] Fax: [phone]    Prescription Name:vaccines  What do they need to clarify?:drug interaction  Best Call Back Number:[phone]    Additional Information: calling to discuss possible drug interactions with vaccines

## 2024-11-13 ENCOUNTER — PATIENT MESSAGE (OUTPATIENT)
Dept: ADMINISTRATIVE | Facility: OTHER | Age: 59
End: 2024-11-13
Payer: COMMERCIAL

## 2024-11-13 ENCOUNTER — OFFICE VISIT (OUTPATIENT)
Dept: ORTHOPEDICS | Facility: CLINIC | Age: 59
End: 2024-11-13
Payer: COMMERCIAL

## 2024-11-13 VITALS — WEIGHT: 151.69 LBS | HEIGHT: 67 IN | BODY MASS INDEX: 23.81 KG/M2

## 2024-11-13 DIAGNOSIS — M18.12 ARTHRITIS OF CARPOMETACARPAL (CMC) JOINT OF LEFT THUMB: Primary | ICD-10-CM

## 2024-11-13 PROCEDURE — 99999 PR PBB SHADOW E&M-EST. PATIENT-LVL III: CPT | Mod: PBBFAC,,, | Performed by: ORTHOPAEDIC SURGERY

## 2024-11-13 RX ORDER — TRIAMCINOLONE ACETONIDE 40 MG/ML
40 INJECTION, SUSPENSION INTRA-ARTICULAR; INTRAMUSCULAR
Status: DISCONTINUED | OUTPATIENT
Start: 2024-11-13 | End: 2024-11-13 | Stop reason: HOSPADM

## 2024-11-13 RX ADMIN — TRIAMCINOLONE ACETONIDE 40 MG: 40 INJECTION, SUSPENSION INTRA-ARTICULAR; INTRAMUSCULAR at 09:11

## 2024-11-13 NOTE — PROCEDURES
Small Joint Aspiration/Injection: L thumb CMC    Date/Time: 11/13/2024 9:00 AM    Performed by: Miah Blanca MD  Authorized by: Miah Blanca MD    Consent Done?:  Yes (Verbal)  Indications:  Pain  Site marked: the procedure site was marked    Timeout: prior to procedure the correct patient, procedure, and site was verified    Local anesthetic:  Topical anesthetic  Location:  Thumb  Site:  L thumb CMC (Left thumb CMC joint)  Ultrasonic guidance for needle placement?: No    Needle size:  25 G  Medications:  40 mg triamcinolone acetonide 40 mg/mL; 40 mg triamcinolone acetonide 40 mg/mL (20 mg injected)  Patient tolerance:  Patient tolerated the procedure well with no immediate complications

## 2024-11-13 NOTE — PROGRESS NOTES
Ms Contreras returns to clinic today.  Has a history of left wrist CMC arthritis.  She had an injection approximately 4 months ago.  She was continuing to pain about the hand.  She was here today to discuss further treatment     Physical exam:  Examination of the left hand and thumb reveals that there is mild prominence of the thumb CMC joint.  Palpation over the joint does produce tenderness.  Grind testing reveals crepitance and pain.  She was neurovascularly intact     Assessment: Left thumb CMC arthritis     Plan:     1. After consent was obtained injection was placed to the left thumb CMC joint     2.  She will follow up with me as needed.  We have discussed the possibility of CMC joint arthroplasty at some point in the future

## 2024-11-17 ENCOUNTER — PATIENT MESSAGE (OUTPATIENT)
Dept: RHEUMATOLOGY | Facility: CLINIC | Age: 59
End: 2024-11-17
Payer: COMMERCIAL

## 2025-01-26 ENCOUNTER — PATIENT MESSAGE (OUTPATIENT)
Dept: RHEUMATOLOGY | Facility: CLINIC | Age: 60
End: 2025-01-26
Payer: COMMERCIAL

## 2025-03-11 ENCOUNTER — RESULTS FOLLOW-UP (OUTPATIENT)
Dept: RHEUMATOLOGY | Facility: CLINIC | Age: 60
End: 2025-03-11

## 2025-03-13 NOTE — PROGRESS NOTES
"Subjective:      Patient ID: Diane Vazquez is a 59 y.o. female.    Chief Complaint: Disease Management    HPI    Rheumatologic History:      - Diagnosis/es:              - Seropositive non-erosive RA initially diagnosed around               - Osteopenia with low FRAX  - Social History: Tobacco abuse (smokes 1/2 PPD since her 20s); rare alcohol intake; - - Occupation: nurse  - Family History: RA: mother  - Obstetric History:  and had preeclampsia; no VTE   - Positive serologies: RF (49.4), CCP (127)  - Negative serologies: MARILU  - Infectious screening labs: Negative hepatitis B, C, and T spot (3/2024)  - Imaging:              - Xray arthritis survey (2023) no erosive changes              - DEXA (2020) osteopenia with 7.3% risk of a major osteoporotic fracture and a 1.1% risk of hip fracture in the next 10 years (FRAX).   - Previous Treatments:               - Meloxicam: caused nausea              - Humira biweekly: ineffective  - Current Treatments:    - Orencia weekly (10/2024- )  - MTX 10mg weekly plus folic acid daily (15mg caused elevated LFTs; dose increased 3/14/25)  Interval History:  She reports some lower back pain and left thigh pain, as well as morning stiffness lasting 5 to 30 minutes. She denies red, hot, swollen joints.    Objective:   /81 (BP Location: Left arm, Patient Position: Sitting)   Pulse 75   Ht 5' 7" (1.702 m)   Wt 67.2 kg (148 lb 2.4 oz)   BMI 23.20 kg/m²   Physical Exam   Constitutional: normal appearance.   HENT:   Head: Normocephalic and atraumatic.   Cardiovascular: Normal rate, regular rhythm and normal heart sounds.   Pulmonary/Chest: Effort normal and breath sounds normal.   Musculoskeletal:      Comments: No synovitis, dactylitis, enthesitis, effusions     Neurological: She is alert.   Skin: Skin is warm and dry. No rash noted.   No skin thickening, telangiectasias, calcinosis, psoriasiform lesions, lupoid lesions       No data to display    Labs (3/11/25) "   CBC WNL   CMP WNL   ESR CRP WNL    Assessment:     1. Rheumatoid arthritis, involving unspecified site, unspecified whether rheumatoid factor present    2. Drug-induced immunodeficiency    3. Rheumatoid arthritis flare    4. High risk medications (not anticoagulants) long-term use    5. Chronic left-sided low back pain with left-sided sciatica      This is a 59 year old woman with history of Madsen's esophagus, right trapezius removal (8/2022), Hashimoto's with hypothyroidism, tobacco abuse (currently in the smoking cessation smoking), pre-malignant melanoma (at age 18), HLD, anxiety, osteopenia with low FRAX, and seropositive RA on Orencia weekly and MTX 5mg weekly plus folic. She reports some lower back pain and left thigh pain, as well as morning stiffness lasting 5 to 30 minutes. She denies red, hot, swollen joints. Try increasing MTX to 10mg weekly. Obtain Xray lumbar spine.     Plan:     Problem List Items Addressed This Visit          Immunology/Multi System    RA (rheumatoid arthritis) - Primary    Relevant Medications    methotrexate 2.5 MG Tab    Other Relevant Orders    CBC Auto Differential    Sedimentation rate    C-Reactive Protein    Creatinine, Serum    AST (SGOT)    ALT (SGPT)    Drug-induced immunodeficiency    Relevant Orders    CBC Auto Differential    Sedimentation rate    C-Reactive Protein    Creatinine, Serum    AST (SGOT)    ALT (SGPT)       Palliative Care    High risk medications (not anticoagulants) long-term use    Relevant Orders    CBC Auto Differential    Sedimentation rate    C-Reactive Protein    Creatinine, Serum    AST (SGOT)    ALT (SGPT)     Other Visit Diagnoses         Rheumatoid arthritis flare        Relevant Orders    CBC Auto Differential    Sedimentation rate    C-Reactive Protein    Creatinine, Serum    AST (SGOT)    ALT (SGPT)      Chronic left-sided low back pain with left-sided sciatica        Relevant Orders    X-Ray Lumbar Spine AP And Lateral          1.)  Seropositive RA  2.) Drug induced immunodeficiency  3.) High risk medication use  - Orencia weekly  - MTX 5mg weekly plus folic acid daily   - Will try to avoid IL6i and Juan Francisco due to history of HLD and Juan Francisco due to tobacco abuse. Suspect that she is a TNFi non responder given lack of response to Humira.   - CBC, CMP, ESR, CRP in 4 weeks then every 12 weeks  - Pre-DMARD labs yearly  - Immunizations: she needs HD flu, PCV20, and Shingrix  - Smoking cessation program (already enrolled)    4.) Lower back pain  - Xray lumbar spine    Follow up in 3 months    30 minutes of total time spent on the encounter, which includes face to face time and non-face to face time preparing to see the patient (eg, review of tests), Obtaining and/or reviewing separately obtained history, Documenting clinical information in the electronic or other health record, Independently interpreting results (not separately reported) and communicating results to the patient/family/caregiver, or Care coordination (not separately reported).     This note was prepared with [employer] Direct voice recognition transcription software. Garbled syntax, mangled pronouns, and other bizarre constructions may be attributed to that software system       Lu Berrios M.D.  Rheumatology Dept  [place], [place]

## 2025-03-14 ENCOUNTER — HOSPITAL ENCOUNTER (OUTPATIENT)
Dept: RADIOLOGY | Facility: HOSPITAL | Age: 60
Discharge: HOME | End: 2025-03-14
Attending: STUDENT IN AN ORGANIZED HEALTH CARE EDUCATION/TRAINING PROGRAM
Payer: COMMERCIAL

## 2025-03-14 ENCOUNTER — OFFICE VISIT (OUTPATIENT)
Dept: RHEUMATOLOGY | Facility: CLINIC | Age: 60
End: 2025-03-14
Payer: COMMERCIAL

## 2025-03-14 VITALS
DIASTOLIC BLOOD PRESSURE: 81 MMHG | WEIGHT: 148.13 LBS | SYSTOLIC BLOOD PRESSURE: 117 MMHG | HEIGHT: 67 IN | HEART RATE: 75 BPM | BODY MASS INDEX: 23.25 KG/M2

## 2025-03-14 DIAGNOSIS — M54.42 CHRONIC LEFT-SIDED LOW BACK PAIN WITH LEFT-SIDED SCIATICA: ICD-10-CM

## 2025-03-14 DIAGNOSIS — G89.29 CHRONIC LEFT-SIDED LOW BACK PAIN WITH LEFT-SIDED SCIATICA: ICD-10-CM

## 2025-03-14 DIAGNOSIS — M06.9 RHEUMATOID ARTHRITIS, INVOLVING UNSPECIFIED SITE, UNSPECIFIED WHETHER RHEUMATOID FACTOR PRESENT: Primary | ICD-10-CM

## 2025-03-14 DIAGNOSIS — Z79.899 HIGH RISK MEDICATIONS (NOT ANTICOAGULANTS) LONG-TERM USE: ICD-10-CM

## 2025-03-14 DIAGNOSIS — Z79.899 DRUG-INDUCED IMMUNODEFICIENCY: ICD-10-CM

## 2025-03-14 DIAGNOSIS — M06.9 RHEUMATOID ARTHRITIS FLARE: ICD-10-CM

## 2025-03-14 DIAGNOSIS — D84.821 DRUG-INDUCED IMMUNODEFICIENCY: ICD-10-CM

## 2025-03-14 PROCEDURE — 72100 X-RAY EXAM L-S SPINE 2/3 VWS: CPT | Mod: TC,FY,PO

## 2025-03-14 PROCEDURE — 99999 PR PBB SHADOW E&M-EST. PATIENT-LVL IV: CPT | Mod: PBBFAC,,, | Performed by: STUDENT IN AN ORGANIZED HEALTH CARE EDUCATION/TRAINING PROGRAM

## 2025-03-14 PROCEDURE — 72100 X-RAY EXAM L-S SPINE 2/3 VWS: CPT | Mod: 26,,, | Performed by: RADIOLOGY

## 2025-03-14 RX ORDER — METHOTREXATE 2.5 MG/1
10 TABLET ORAL
Qty: 48 TABLET | Refills: 3 | Status: SHIPPED | OUTPATIENT
Start: 2025-03-14 | End: 2026-03-14

## 2025-03-14 ASSESSMENT — ROUTINE ASSESSMENT OF PATIENT INDEX DATA (RAPID3)
PSYCHOLOGICAL DISTRESS SCORE: 1.1
FATIGUE SCORE: 2.2
TOTAL RAPID3 SCORE: 4.05
MDHAQ FUNCTION SCORE: 0.8
PAIN SCORE: 4.5
PATIENT GLOBAL ASSESSMENT SCORE: 5

## 2025-03-17 ENCOUNTER — RESULTS FOLLOW-UP (OUTPATIENT)
Dept: RHEUMATOLOGY | Facility: CLINIC | Age: 60
End: 2025-03-17

## 2025-04-11 ENCOUNTER — OFFICE VISIT (OUTPATIENT)
Dept: ORTHOPEDICS | Facility: CLINIC | Age: 60
End: 2025-04-11
Payer: COMMERCIAL

## 2025-04-11 ENCOUNTER — HOSPITAL ENCOUNTER (OUTPATIENT)
Dept: RADIOLOGY | Facility: HOSPITAL | Age: 60
Discharge: HOME | End: 2025-04-11
Attending: PHYSICIAN ASSISTANT
Payer: COMMERCIAL

## 2025-04-11 VITALS — WEIGHT: 145.75 LBS | HEIGHT: 67 IN | BODY MASS INDEX: 22.88 KG/M2

## 2025-04-11 DIAGNOSIS — M18.12 ARTHRITIS OF CARPOMETACARPAL (CMC) JOINT OF LEFT THUMB: Primary | ICD-10-CM

## 2025-04-11 DIAGNOSIS — M18.12 ARTHRITIS OF CARPOMETACARPAL (CMC) JOINT OF LEFT THUMB: ICD-10-CM

## 2025-04-11 PROCEDURE — 73130 X-RAY EXAM OF HAND: CPT | Mod: 26,LT,, | Performed by: RADIOLOGY

## 2025-04-11 PROCEDURE — 73130 X-RAY EXAM OF HAND: CPT | Mod: TC,PO,RT

## 2025-04-11 PROCEDURE — 99999 PR PBB SHADOW E&M-EST. PATIENT-LVL IV: CPT | Mod: PBBFAC,,, | Performed by: PHYSICIAN ASSISTANT

## 2025-04-11 PROCEDURE — 73130 X-RAY EXAM OF HAND: CPT | Mod: TC,PO,LT

## 2025-04-11 PROCEDURE — 73130 X-RAY EXAM OF HAND: CPT | Mod: 26,RT,, | Performed by: RADIOLOGY

## 2025-04-11 RX ORDER — TRIAMCINOLONE ACETONIDE 40 MG/ML
40 INJECTION, SUSPENSION INTRA-ARTICULAR; INTRAMUSCULAR
Status: DISCONTINUED | OUTPATIENT
Start: 2025-04-11 | End: 2025-04-11 | Stop reason: HOSPADM

## 2025-04-11 RX ADMIN — TRIAMCINOLONE ACETONIDE 40 MG: 40 INJECTION, SUSPENSION INTRA-ARTICULAR; INTRAMUSCULAR at 10:04

## 2025-04-11 NOTE — PROGRESS NOTES
4/11/2025    HPI:  Diane Vazquez is a 59 y.o. female, who presents to clinic today for continued evaluation of her 1st CMC joint osteoarthritis of the left hand/thumb.  States he is here today to discuss about further treatment options.  States he also has some very mild intermittent pain of the base of the right hand/thumb that she would like to discuss.  Denies any other complaints at this time.    PMHX:  Past Medical History:   Diagnosis Date    Anxiety state, unspecified     Madsen's esophagus 1990s    Last EGD ~2008, before she moved here from [place].     Early stage skin cancer     General anesthetics causing adverse effect in therapeutic use     Hyperlipidemia     Hypothyroidism     Other and unspecified hyperlipidemia     Tobacco use disorder        PSHX:  Past Surgical History:   Procedure Laterality Date    CHOLECYSTECTOMY  ~2006    COLONOSCOPY  ~2004    Father with hx of colon cancer.  Had her first one at 40 y/o.    COLONOSCOPY N/A 11/04/2022    Procedure: COLONOSCOPY;  Surgeon: Levi Fry MD;  Location: [facility];  Service: Endoscopy;  Laterality: N/A;    ESOPHAGOGASTRODUODENOSCOPY  ~2008    Madsen's esophagus.    ESOPHAGOGASTRODUODENOSCOPY N/A 11/04/2022    Procedure: EGD (ESOPHAGOGASTRODUODENOSCOPY);  Surgeon: Levi Fry MD;  Location: [facility];  Service: Endoscopy;  Laterality: N/A;    HAND ARTHROPLASTY Right 08/25/2022    Procedure: Right thumb CMC arthroplasty;  Surgeon: Miah Blanca MD;  Location: [facility];  Service: Orthopedics;  Laterality: Right;       FMHX:  Family History   Problem Relation Name Age of Onset    Melanoma Mother Jeanell     Arthritis Mother Jeanell     Cancer Mother Jeanell     Depression Mother Jeanell     Heart disease Father Ruben     Hyperlipidemia Father Ruben     Hypertension Father Ruben     Cancer Father Ruben         colon, lungs    Depression Daughter Katelyn     Depression Maternal Grandmother Ginny     Alcohol abuse Maternal Grandmother  "Ginny     Early death Maternal Grandmother Ginny     Early death Maternal Grandfather Evaristo     Breast cancer Paternal Grandmother Dilma         50's    Cancer Paternal Grandmother Dilma     Early death Paternal Grandmother Dilma     Mental illness Brother      Mental illness Brother Robert        SOCHX:  Social History     Tobacco Use    Smoking status: Every Day     Current packs/day: 0.50     Average packs/day: 0.5 packs/day for 50.3 years (25.1 ttl pk-yrs)     Types: Cigarettes     Start date: 1984    Smokeless tobacco: Never   Substance Use Topics    Alcohol use: Yes     Alcohol/week: 2.0 standard drinks of alcohol     Comment: 2 to 3 times a month wine/mixed drink       ALLERGIES:  Mushroom and Meloxicam    CURRENT MEDICATIONS:  Medications Ordered Prior to Encounter[1]    REVIEW OF SYSTEMS:  Review of Systems Complete; Negative, unless noted above.    GENERAL PHYSICAL EXAM:   Ht 5' 7" (1.702 m)   Wt 66.1 kg (145 lb 11.6 oz)   BMI 22.82 kg/m²    GEN: well developed, well nourished, no acute distress   PULM: No wheezing, no respiratory distress   CV: RRR    ORTHO EXAM:   Examination of the right hand/thumb reveals no edema, erythema, ecchymosis, or skin breakdown.  Able to make composite fist and fully extend all fingers.  Able to flex extend the fingers appropriately.  Normal sensation in the radial, ulnar, and median nerve distributions.  Capillary refill less than 2 seconds.    RADIOLOGY:   X-rays of the bilateral hands were taken today in clinic.  X-rays read by myself.  Imaging showed no acute fracture or dislocation.  No subluxation.  No radiopaque foreign body or mass noted.  No osseous destructive/erosive processes noted.  Presence of a mild amount of subsidence of the 1st metacarpal of the right hand.  There does appear to be some level of suspension that remains from the 1st CMC joint CMC suspension arthroplasty.  Presence of chronic changes of the distal pole of the scaphoid.  " Presence of severe degenerative changes of the 1st CMC joint of the left hand/thumb.  No other significant bony abnormalities noted.    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 I discussed with Diane Vazquez that the best course of action this time for the right hand/thumb is to monitor her symptoms.  We did discuss if her symptoms become worse significant unless intermittent to contact the clinic for reappointment and evaluation.  We discussed for the left hand/thumb the current treatment options at this time we will proceed with a repeat steroid injection or to discuss surgical intervention.  She elected to proceed with the steroid injection this time.      2. Informed consent was obtained.  After an alcohol prep followed by a chlorhexidine prep, a steroid injection was placed into the 1st CMC joint of the left hand/thumb.  She tolerated the procedure well with immediate complications     3.  I would like her to follow up in clinic on a PRN basis.  She was instructed to contact the clinic for any problems or concerns.           [1]   Current Outpatient Medications on File Prior to Visit   Medication Sig Dispense Refill    abatacept (ORENCIA CLICKJECT) 125 mg/mL AtIn Inject 125 mg into the skin every 7 days. 12 each 3    albuterol (PROVENTIL/VENTOLIN HFA) 90 mcg/actuation inhaler Inhale 1-2 puffs into the lungs every 6 (six) hours as needed for Wheezing or Shortness of Breath. Rescue 18 g 1    ALPRAZolam (XANAX) 0.25 MG tablet Take 1 tablet (0.25 mg total) by mouth nightly as needed for Anxiety. 30 tablet 0    cyanocobalamin 1,000 mcg/mL injection Inject 1 mL (1,000 mcg total) into the skin every 28 days. 4 mL 2    ezetimibe (ZETIA) 10 mg tablet Take 1 tablet (10 mg total) by mouth once daily. 90 tablet 3    fluticasone propionate (FLONASE) 50 mcg/actuation nasal spray 1 spray (50 mcg total) by Each Nostril route 2  (two) times daily. 16 mL 3    folic acid (FOLVITE) 1 MG tablet Take 1 tablet (1 mg total) by mouth once daily. 90 tablet 3    ibuprofen (ADVIL,MOTRIN) 800 MG tablet Take 1 tablet (800 mg total) by mouth every 12 (twelve) hours as needed for Pain. 30 tablet 1    levothyroxine (LEVOXYL) 88 MCG tablet Take 1 tablet (88 mcg total) by mouth before breakfast. 90 tablet 3    LIDOcaine (LIDODERM) 5 % Place 1 patch onto the skin once daily. Remove & Discard patch within 12 hours or as directed by MD 30 patch 1    methotrexate 2.5 MG Tab Take 4 tablets (10 mg total) by mouth every 7 days. 48 tablet 3    multivitamin with minerals tablet Take 1 tablet by mouth once daily.      nicotine (NICODERM CQ) 14 mg/24 hr Place 1 patch onto the skin once daily. 30 patch 2    omeprazole (PRILOSEC) 20 MG capsule Take 2 capsules (40 mg total) by mouth once daily. 90 capsule 0    predniSONE (DELTASONE) 5 MG tablet Take 1 tablet (5 mg total) by mouth once daily. 90 tablet 3    tirzepatide, weight loss, (ZEPBOUND) 2.5 mg/0.5 mL PnIj Inject 2.5 mg into the skin every 7 days. 0.5 mL 2    vitamin D (VITAMIN D3) 1000 units Tab Take 2,000 Units by mouth once daily.       Current Facility-Administered Medications on File Prior to Visit   Medication Dose Route Frequency Provider Last Rate Last Admin    lactated ringers infusion   Intravenous Continuous Carmenza Barrios MD   Stopped at 08/25/22 2516

## 2025-04-11 NOTE — PROCEDURES
Small Joint Aspiration/Injection: L thumb CMC    Date/Time: 4/11/2025 10:20 AM    Performed by: Rm Finch PA-C  Authorized by: Rm Finch PA-C    Consent Done?:  Yes (Verbal)  Indications:  Arthritis and pain  Site marked: the procedure site was marked    Timeout: prior to procedure the correct patient, procedure, and site was verified    Prep: patient was prepped and draped in usual sterile fashion      Local anesthetic:  Topical anesthetic  Location:  Thumb  Site:  L thumb CMC  Ultrasonic guidance for needle placement?: No    Needle size:  25 G  Approach:  Radial  Medications:  40 mg triamcinolone acetonide 40 mg/mL (40 mg injected)  Patient tolerance:  Patient tolerated the procedure well with no immediate complications

## 2025-05-10 ENCOUNTER — PATIENT MESSAGE (OUTPATIENT)
Dept: RHEUMATOLOGY | Facility: CLINIC | Age: 60
End: 2025-05-10
Payer: COMMERCIAL

## 2025-06-19 NOTE — PROGRESS NOTES
"Subjective:      Patient ID: Diane Vazquez is a 59 y.o. female.    Chief Complaint: Disease Management    HPI    Rheumatologic History:      - Diagnosis/es:              - Seropositive non-erosive RA initially diagnosed around               - Osteopenia with low FRAX  - Social History: Tobacco abuse (smokes 1/2 PPD since her 20s); rare alcohol intake; - - Occupation: nurse  - Family History: RA: mother  - Obstetric History:  and had preeclampsia; no VTE   - Positive serologies: RF (49.4), CCP (127)  - Negative serologies: MARILU  - Infectious screening labs: Negative hepatitis B, C, and T spot (3/2024)  - Imaging:              - Xray arthritis survey (2023) no erosive changes              - DEXA (2020) osteopenia with 7.3% risk of a major osteoporotic fracture and a 1.1% risk of hip fracture in the next 10 years (FRAX).    - Xray lumbar spine (3/14/25) Mild broad levocurvature. Trace retrolisthesis of L1 on L2 and L2 on L3. Vertebral body heights are preserved. No fractures or bony destructive changes. No major degenerative disc height loss identified.   - Previous Treatments:               - Meloxicam: caused nausea              - Humira biweekly: ineffective  - Current Treatments:               - Orencia weekly (10/2024- )  - MTX 10mg weekly plus folic acid daily (15mg caused elevated LFTs; dose increased 3/14/25)  Interval History:  She is doing well. She reports mild hip gelling lasting 10 minutes, but otherwise denies joint pain and swelling. She plans for left CMC surgery.     Objective:   BP (!) 142/88 (BP Location: Left arm, Patient Position: Sitting)   Pulse 76   Ht 5' 7" (1.702 m)   Wt 65.4 kg (144 lb 2.9 oz)   BMI 22.58 kg/m²   Physical Exam   Constitutional: normal appearance.   HENT:   Head: Normocephalic and atraumatic.   Cardiovascular: Normal rate, regular rhythm and normal heart sounds.   Pulmonary/Chest: Effort normal and breath sounds normal.   Musculoskeletal:      Comments: No " synovitis, dactylitis, enthesitis, effusions     Neurological: She is alert.   Skin: Skin is warm and dry. No rash noted.   No skin thickening, telangiectasias, calcinosis, psoriasiform lesions, lupoid lesions         No data to display    Labs (5/16/25)  CBC WNL  CR, AST, ALT WNL  ESR CRP WNL     Assessment:     1. Rheumatoid arthritis, involving unspecified site, unspecified whether rheumatoid factor present    2. Drug-induced immunodeficiency    3. High risk medications (not anticoagulants) long-term use    4. Osteopenia after menopause      This is a 59 year old woman with history of Madsen's esophagus, right trapezius removal (8/2022), Hashimoto's with hypothyroidism, tobacco abuse (currently in the smoking cessation program), pre-malignant melanoma (at age 18), HLD, anxiety, osteopenia with low FRAX, and seropositive RA on Orencia weekly and MTX 10mg weekly plus folic daily. She is in remission. Continue current medication. If she goes for CMC surgery, she is to hold Orencia one week before and not restart until at least 2 weeks post op when the wound shows appropriate signs of wound healing. It is safe to continue methotrexate through the perioperative period.      Plan:     Problem List Items Addressed This Visit          Immunology/Multi System    RA (rheumatoid arthritis) - Primary    Relevant Orders    CBC Auto Differential    Sedimentation rate    C-Reactive Protein    Creatinine, Serum    AST (SGOT)    ALT (SGPT)    Drug-induced immunodeficiency    Relevant Orders    CBC Auto Differential    Sedimentation rate    C-Reactive Protein    Creatinine, Serum    AST (SGOT)    ALT (SGPT)       Palliative Care    High risk medications (not anticoagulants) long-term use    Relevant Orders    CBC Auto Differential    Sedimentation rate    C-Reactive Protein    Creatinine, Serum    AST (SGOT)    ALT (SGPT)     Other Visit Diagnoses         Osteopenia after menopause              1.) Seropositive RA  2.) Drug  induced immunodeficiency  3.) High risk medication use  - Orencia weekly  - MTX 10mg weekly plus folic acid daily   - Will try to avoid IL6i and Juan Francisco due to history of HLD and Juan Francisco due to tobacco abuse. Suspect that she is a TNFi non responder given lack of response to Humira.   - CBC, CMP, ESR, CRP in 4 weeks then every 12 weeks  - Pre-DMARD labs yearly  - Immunizations: she needs HD flu, PCV20, and Shingrix  - Smoking cessation program (already enrolled)  - DEXA    Follow up in 6 months or sooner if needed    30 minutes of total time spent on the encounter, which includes face to face time and non-face to face time preparing to see the patient (eg, review of tests), Obtaining and/or reviewing separately obtained history, Documenting clinical information in the electronic or other health record, Independently interpreting results (not separately reported) and communicating results to the patient/family/caregiver, or Care coordination (not separately reported).     This note was prepared with [employer] Direct voice recognition transcription software. Garbled syntax, mangled pronouns, and other bizarre constructions may be attributed to that software system       Lu Berrios M.D.  Rheumatology Dept  [place], [place]

## 2025-06-20 ENCOUNTER — OFFICE VISIT (OUTPATIENT)
Dept: RHEUMATOLOGY | Facility: CLINIC | Age: 60
End: 2025-06-20
Payer: COMMERCIAL

## 2025-06-20 VITALS
HEART RATE: 76 BPM | HEIGHT: 67 IN | BODY MASS INDEX: 22.63 KG/M2 | DIASTOLIC BLOOD PRESSURE: 88 MMHG | SYSTOLIC BLOOD PRESSURE: 142 MMHG | WEIGHT: 144.19 LBS

## 2025-06-20 DIAGNOSIS — Z79.899 HIGH RISK MEDICATIONS (NOT ANTICOAGULANTS) LONG-TERM USE: ICD-10-CM

## 2025-06-20 DIAGNOSIS — M06.9 RHEUMATOID ARTHRITIS, INVOLVING UNSPECIFIED SITE, UNSPECIFIED WHETHER RHEUMATOID FACTOR PRESENT: Primary | ICD-10-CM

## 2025-06-20 DIAGNOSIS — M85.80 OSTEOPENIA AFTER MENOPAUSE: ICD-10-CM

## 2025-06-20 DIAGNOSIS — Z79.899 DRUG-INDUCED IMMUNODEFICIENCY: ICD-10-CM

## 2025-06-20 DIAGNOSIS — Z78.0 OSTEOPENIA AFTER MENOPAUSE: ICD-10-CM

## 2025-06-20 DIAGNOSIS — D84.821 DRUG-INDUCED IMMUNODEFICIENCY: ICD-10-CM

## 2025-06-20 PROCEDURE — 99999 PR PBB SHADOW E&M-EST. PATIENT-LVL IV: CPT | Mod: PBBFAC,,, | Performed by: STUDENT IN AN ORGANIZED HEALTH CARE EDUCATION/TRAINING PROGRAM

## 2025-07-10 ENCOUNTER — PATIENT MESSAGE (OUTPATIENT)
Dept: ORTHOPEDICS | Facility: CLINIC | Age: 60
End: 2025-07-10
Payer: COMMERCIAL

## 2025-08-08 ENCOUNTER — OFFICE VISIT (OUTPATIENT)
Dept: ORTHOPEDICS | Facility: CLINIC | Age: 60
End: 2025-08-08
Payer: COMMERCIAL

## 2025-08-08 ENCOUNTER — PATIENT MESSAGE (OUTPATIENT)
Dept: RHEUMATOLOGY | Facility: CLINIC | Age: 60
End: 2025-08-08
Payer: COMMERCIAL

## 2025-08-08 VITALS — BODY MASS INDEX: 22.29 KG/M2 | WEIGHT: 142 LBS | HEIGHT: 67 IN

## 2025-08-08 DIAGNOSIS — M18.12 ARTHRITIS OF CARPOMETACARPAL (CMC) JOINT OF LEFT THUMB: Primary | ICD-10-CM

## 2025-08-08 PROCEDURE — 99999 PR PBB SHADOW E&M-EST. PATIENT-LVL III: CPT | Mod: PBBFAC,,, | Performed by: ORTHOPAEDIC SURGERY

## 2025-08-08 NOTE — PROGRESS NOTES
8/8/2025    Chief Complaint:  Chief Complaint   Patient presents with    Left Hand - Pain       HPI:  Diane Vazquez is a 60 y.o. female, who presents to clinic today she has a history of bilateral thumb CMC arthritis.  She has a right thumb CMC arthroplasty and did relatively well.  Her left thumb is continuing to hurt her.  We have treated this conservatively with injections for a long time.  She is here today to discuss further treatment.    PMHX:  Past Medical History:   Diagnosis Date    Anxiety state, unspecified     Madsen's esophagus 1990s    Last EGD ~2008, before she moved here from [place].     Early stage skin cancer     General anesthetics causing adverse effect in therapeutic use     Hyperlipidemia     Hypothyroidism     Other and unspecified hyperlipidemia     Tobacco use disorder        PSHX:  Past Surgical History:   Procedure Laterality Date    CHOLECYSTECTOMY  ~2006    COLONOSCOPY  ~2004    Father with hx of colon cancer.  Had her first one at 38 y/o.    COLONOSCOPY N/A 11/04/2022    Procedure: COLONOSCOPY;  Surgeon: Levi Fry MD;  Location: [facility];  Service: Endoscopy;  Laterality: N/A;    ESOPHAGOGASTRODUODENOSCOPY  ~2008    Madsen's esophagus.    ESOPHAGOGASTRODUODENOSCOPY N/A 11/04/2022    Procedure: EGD (ESOPHAGOGASTRODUODENOSCOPY);  Surgeon: Levi Fry MD;  Location: [facility] ENDO;  Service: Endoscopy;  Laterality: N/A;    HAND ARTHROPLASTY Right 08/25/2022    Procedure: Right thumb CMC arthroplasty;  Surgeon: Miah Blanca MD;  Location: [facility];  Service: Orthopedics;  Laterality: Right;       FMHX:  Family History   Problem Relation Name Age of Onset    Melanoma Mother Jeanell     Arthritis Mother Jeanell     Cancer Mother Jeanell     Depression Mother Jeanell     Heart disease Father Ruben     Hyperlipidemia Father Ruben     Hypertension Father Ruben     Cancer Father Ruben         colon, lungs    Depression Daughter Katelyn     Depression Maternal Grandmother Ginny   "   Alcohol abuse Maternal Grandmother Ginny     Early death Maternal Grandmother [place]     Early death Maternal Grandfather Evaristo     Breast cancer Paternal Grandmother Dilma         50's    Cancer Paternal Grandmother Dilma     Early death Paternal Grandmother Dilma     Mental illness Brother      Mental illness Brother Robert        SOCHX:  Social History     Tobacco Use    Smoking status: Every Day     Current packs/day: 0.50     Average packs/day: 0.5 packs/day for 50.6 years (25.3 ttl pk-yrs)     Types: Cigarettes     Start date: 1984    Smokeless tobacco: Never   Substance Use Topics    Alcohol use: Yes     Alcohol/week: 2.0 standard drinks of alcohol     Comment: 2 to 3 times a month wine/mixed drink       ALLERGIES:  Mushroom and Meloxicam    CURRENT MEDICATIONS:  Medications Ordered Prior to Encounter[1]    REVIEW OF SYSTEMS:  ROS    GENERAL PHYSICAL EXAM:   Ht 5' 7" (1.702 m)   Wt 64.4 kg (141 lb 15.6 oz)   BMI 22.24 kg/m²    GEN: well developed, well nourished, no acute distress   HENT: Normocephalic, atraumatic   EYES: No discharge, conjunctiva normal   NECK: Supple, non-tender   PULM: No wheezing, no respiratory distress   CV: RRR   ABD: Soft, non-tender    ORTHO EXAM:   Examination of the left hand and thumb reveals that there is no edema.  There are no major skin changes.  She does have prominence of the thumb CMC joint.  She does have a small amount of fat atrophy from the previous injections.  Palpation over the CMC joint does produce tenderness and she has a positive grind test.  She does have capillary refill less than 2 seconds in the fingers and sensation is grossly intact    RADIOLOGY:   X-ray of the left hand from 04/11/2025 has been reviewed.  He is noted have moderate to severe left thumb CMC arthritis    ASSESSMENT:   Left thumb CMC arthritis    PLAN:  1. I have discussed treatments with the patient.  We have discussed the possibility of CMC suspension arthroplasty.  We did " discuss the risks and benefits and consent has been obtained     2.  Will proceed with left thumb CMC suspension arthroplasty under general anesthesia with a single-shot supraclavicular block     3.  She will follow up with me 2 weeks after the surgery           [1]   Current Outpatient Medications on File Prior to Visit   Medication Sig Dispense Refill    abatacept (ORENCIA CLICKJECT) 125 mg/mL AtIn Inject 125 mg into the skin every 7 days. 12 each 3    albuterol (PROVENTIL/VENTOLIN HFA) 90 mcg/actuation inhaler Inhale 1-2 puffs into the lungs every 6 (six) hours as needed for Wheezing or Shortness of Breath. Rescue 18 g 1    ALPRAZolam (XANAX) 0.25 MG tablet TAKE 1 TABLET BY MOUTH NIGHTLY AS NEEDED FOR ANXIETY 30 tablet 0    buPROPion (WELLBUTRIN) 75 MG tablet Take 1 tablet (75 mg total) by mouth 2 (two) times daily. (Patient not taking: Reported on 7/9/2025) 180 tablet 2    cyanocobalamin 1,000 mcg/mL injection Inject 1 mL (1,000 mcg total) into the skin every 28 days. 4 mL 2    ezetimibe (ZETIA) 10 mg tablet Take 1 tablet (10 mg total) by mouth once daily. 90 tablet 3    fluticasone propionate (FLONASE) 50 mcg/actuation nasal spray 1 spray (50 mcg total) by Each Nostril route 2 (two) times daily. 16 mL 3    folic acid (FOLVITE) 1 MG tablet Take 1 tablet (1 mg total) by mouth once daily. 90 tablet 3    ibuprofen (ADVIL,MOTRIN) 800 MG tablet Take 1 tablet (800 mg total) by mouth every 12 (twelve) hours as needed for Pain. 30 tablet 1    levothyroxine (SYNTHROID) 75 MCG tablet Take 1 tablet (75 mcg total) by mouth once daily. 30 tablet 3    LIDOcaine (LIDODERM) 5 % Place 1 patch onto the skin once daily. Remove & Discard patch within 12 hours or as directed by MD 30 patch 1    methotrexate 2.5 MG Tab Take 4 tablets (10 mg total) by mouth every 7 days. 48 tablet 3    nicotine (NICODERM CQ) 21 mg/24 hr Place 1 patch onto the skin once daily. (Patient not taking: Reported on 7/9/2025) 14 patch 3    omeprazole  (PRILOSEC) 20 MG capsule Take 1 capsule (20 mg total) by mouth once daily. 90 capsule 2    predniSONE (DELTASONE) 5 MG tablet Take 1 tablet (5 mg total) by mouth once daily. 90 tablet 3    scopolamine (TRANSDERM-SCOP) 1.3-1.5 mg (1 mg over 3 days) Place 1 patch onto the skin every 72 hours. 1 patch 0    sumatriptan (IMITREX STATDOSE) 6 mg/0.5 mL kit Inject 0.5 mLs (6 mg total) into the skin every 2 (two) hours as needed for Migraine. 1 mL 1     Current Facility-Administered Medications on File Prior to Visit   Medication Dose Route Frequency Provider Last Rate Last Admin    lactated ringers infusion   Intravenous Continuous Carmenza Barrios MD   Stopped at 08/25/22 9674

## 2025-08-08 NOTE — PATIENT INSTRUCTIONS
Surgery Instructions:     Your surgery is scheduled on 8/28/2025 at the [facility].    The pre-op department will be in contact with you prior to your procedure to review medications and instructions.       Nothing to eat or drink after midnight prior to day of surgery.    You should STOP taking any blood thinners 5 days prior to surgery.     If needed, please obtain medical and cardiac clearance as advised prior to surgery. All preoperative testing should be done as soon as possible as it may be needed to obtain clearance.    The surgery center will contact you the day prior to surgery to advise you of your arrival time for surgery.     Your post op appointment is scheduled on 9/12/2025 at 10:40.    *Please answer the post op Patient IQ questions. They will be sent via email or text message at baseline, 3 months, 6 months, 1 year and 2 years after your surgery*

## 2025-08-11 ENCOUNTER — PATIENT MESSAGE (OUTPATIENT)
Dept: ORTHOPEDICS | Facility: CLINIC | Age: 60
End: 2025-08-11
Payer: COMMERCIAL

## 2025-08-11 DIAGNOSIS — M18.12 ARTHRITIS OF CARPOMETACARPAL (CMC) JOINT OF LEFT THUMB: Primary | ICD-10-CM

## 2025-08-11 RX ORDER — CEFAZOLIN SODIUM 2 G/50ML
2 SOLUTION INTRAVENOUS
OUTPATIENT
Start: 2025-08-11

## 2025-08-11 RX ORDER — MUPIROCIN 20 MG/G
OINTMENT TOPICAL
Status: CANCELLED | OUTPATIENT
Start: 2025-08-11

## 2025-08-11 RX ORDER — MUPIROCIN 20 MG/G
OINTMENT TOPICAL
OUTPATIENT
Start: 2025-08-11

## 2025-08-11 RX ORDER — CEFAZOLIN SODIUM 2 G/50ML
2 SOLUTION INTRAVENOUS
Status: CANCELLED | OUTPATIENT
Start: 2025-08-11

## 2025-08-28 PROBLEM — M18.12 ARTHRITIS OF CARPOMETACARPAL (CMC) JOINT OF LEFT THUMB: Status: ACTIVE | Noted: 2019-07-02
